# Patient Record
Sex: MALE | Race: WHITE | NOT HISPANIC OR LATINO | Employment: FULL TIME | URBAN - METROPOLITAN AREA
[De-identification: names, ages, dates, MRNs, and addresses within clinical notes are randomized per-mention and may not be internally consistent; named-entity substitution may affect disease eponyms.]

---

## 2017-06-01 DIAGNOSIS — Z00.00 WELL ADULT EXAM: ICD-10-CM

## 2017-06-02 ENCOUNTER — HOSPITAL ENCOUNTER (OUTPATIENT)
Facility: MEDICAL CENTER | Age: 52
End: 2017-06-02
Attending: FAMILY MEDICINE
Payer: COMMERCIAL

## 2017-06-02 ENCOUNTER — APPOINTMENT (OUTPATIENT)
Dept: OTHER | Facility: MEDICAL CENTER | Age: 52
End: 2017-06-02

## 2017-06-02 DIAGNOSIS — Z00.00 WELL ADULT EXAM: ICD-10-CM

## 2017-06-02 LAB
25(OH)D3 SERPL-MCNC: 16 NG/ML (ref 30–100)
ALBUMIN SERPL BCP-MCNC: 4 G/DL (ref 3.2–4.9)
ALBUMIN/GLOB SERPL: 1.2 G/DL
ALP SERPL-CCNC: 53 U/L (ref 30–99)
ALT SERPL-CCNC: 20 U/L (ref 2–50)
ANION GAP SERPL CALC-SCNC: 8 MMOL/L (ref 0–11.9)
APPEARANCE UR: CLEAR
AST SERPL-CCNC: 19 U/L (ref 12–45)
BASOPHILS # BLD AUTO: 0.8 % (ref 0–1.8)
BASOPHILS # BLD: 0.05 K/UL (ref 0–0.12)
BILIRUB SERPL-MCNC: 0.7 MG/DL (ref 0.1–1.5)
BILIRUB UR QL STRIP.AUTO: NEGATIVE
BUN SERPL-MCNC: 16 MG/DL (ref 8–22)
CALCIUM SERPL-MCNC: 9.2 MG/DL (ref 8.5–10.5)
CHLORIDE SERPL-SCNC: 104 MMOL/L (ref 96–112)
CO2 SERPL-SCNC: 25 MMOL/L (ref 20–33)
COLOR UR: COLORLESS
CREAT SERPL-MCNC: 0.78 MG/DL (ref 0.5–1.4)
CREAT UR-MCNC: 45.2 MG/DL
CRP SERPL HS-MCNC: 5.7 MG/L (ref 0–7.5)
EOSINOPHIL # BLD AUTO: 0.28 K/UL (ref 0–0.51)
EOSINOPHIL NFR BLD: 4.5 % (ref 0–6.9)
ERYTHROCYTE [DISTWIDTH] IN BLOOD BY AUTOMATED COUNT: 40.9 FL (ref 35.9–50)
EST. AVERAGE GLUCOSE BLD GHB EST-MCNC: 117 MG/DL
GFR SERPL CREATININE-BSD FRML MDRD: >60 ML/MIN/1.73 M 2
GLOBULIN SER CALC-MCNC: 3.4 G/DL (ref 1.9–3.5)
GLUCOSE SERPL-MCNC: 78 MG/DL (ref 65–99)
GLUCOSE UR STRIP.AUTO-MCNC: NEGATIVE MG/DL
HBA1C MFR BLD: 5.7 % (ref 0–5.6)
HCT VFR BLD AUTO: 49.8 % (ref 42–52)
HGB BLD-MCNC: 16.4 G/DL (ref 14–18)
IMM GRANULOCYTES # BLD AUTO: 0.02 K/UL (ref 0–0.11)
IMM GRANULOCYTES NFR BLD AUTO: 0.3 % (ref 0–0.9)
KETONES UR STRIP.AUTO-MCNC: NEGATIVE MG/DL
LEUKOCYTE ESTERASE UR QL STRIP.AUTO: NEGATIVE
LYMPHOCYTES # BLD AUTO: 1.59 K/UL (ref 1–4.8)
LYMPHOCYTES NFR BLD: 25.3 % (ref 22–41)
MCH RBC QN AUTO: 28.6 PG (ref 27–33)
MCHC RBC AUTO-ENTMCNC: 32.9 G/DL (ref 33.7–35.3)
MCV RBC AUTO: 86.8 FL (ref 81.4–97.8)
MICRO URNS: NORMAL
MICROALBUMIN UR-MCNC: <0.7 MG/DL
MICROALBUMIN/CREAT UR: NORMAL MG/G (ref 0–30)
MONOCYTES # BLD AUTO: 0.39 K/UL (ref 0–0.85)
MONOCYTES NFR BLD AUTO: 6.2 % (ref 0–13.4)
NEUTROPHILS # BLD AUTO: 3.95 K/UL (ref 1.82–7.42)
NEUTROPHILS NFR BLD: 62.9 % (ref 44–72)
NITRITE UR QL STRIP.AUTO: NEGATIVE
NRBC # BLD AUTO: 0 K/UL
NRBC BLD AUTO-RTO: 0 /100 WBC
PH UR STRIP.AUTO: 6.5 [PH]
PLATELET # BLD AUTO: 212 K/UL (ref 164–446)
PMV BLD AUTO: 11.4 FL (ref 9–12.9)
POTASSIUM SERPL-SCNC: 3.8 MMOL/L (ref 3.6–5.5)
PROT SERPL-MCNC: 7.4 G/DL (ref 6–8.2)
PROT UR QL STRIP: NEGATIVE MG/DL
PSA SERPL-MCNC: 3.41 NG/ML (ref 0–4)
RBC # BLD AUTO: 5.74 M/UL (ref 4.7–6.1)
RBC UR QL AUTO: NEGATIVE
SODIUM SERPL-SCNC: 137 MMOL/L (ref 135–145)
SP GR UR STRIP.AUTO: 1.01
T4 FREE SERPL-MCNC: 1.02 NG/DL (ref 0.53–1.43)
TSH SERPL DL<=0.005 MIU/L-ACNC: 1.73 UIU/ML (ref 0.3–3.7)
WBC # BLD AUTO: 6.3 K/UL (ref 4.8–10.8)

## 2017-06-04 LAB
LPA SERPL-MCNC: 10 MG/DL
SHBG SERPL-SCNC: 37 NMOL/L (ref 11–80)
TESTOST FREE MFR SERPL: 1.7 % (ref 1.6–2.9)
TESTOST FREE SERPL-MCNC: 54 PG/ML (ref 47–244)
TESTOST SERPL-MCNC: 318 NG/DL (ref 300–890)

## 2017-06-07 LAB
CHOLEST SERPL-MCNC: 189 MG/DL (ref 100–199)
HDL PARTICAL NO Q4363: 29.7 UMOL/L
HDL SERPL QN: 9.2 NM
HDLC SERPL-MCNC: 50 MG/DL
HLD.LARGE SERPL-SCNC: 5.8 UMOL/L
LDL MED SERPL QN: 21.8 NM
LDL SERPL QN: 21.8 NM
LDL SERPL-SCNC: 1164 NMOL/L
LDL SMALL SERPL-SCNC: 235 NMOL/L
LDL SMALL SERPL-SCNC: 235 NMOL/L
LDLC SERPL CALC-MCNC: 111 MG/DL (ref 0–99)
LP IR SCORE Q4364: 40
TRIGL SERPL-MCNC: 140 MG/DL (ref 0–149)
VLDL LARGE SERPL-SCNC: 3.6 NMOL/L
VLDL SERPL QN: 45.4 NM

## 2017-06-22 ENCOUNTER — HOSPITAL ENCOUNTER (OUTPATIENT)
Dept: RADIOLOGY | Facility: MEDICAL CENTER | Age: 52
End: 2017-06-22
Attending: FAMILY MEDICINE
Payer: COMMERCIAL

## 2017-06-22 ENCOUNTER — OFFICE VISIT (OUTPATIENT)
Dept: OTHER | Facility: MEDICAL CENTER | Age: 52
End: 2017-06-22

## 2017-06-22 VITALS
SYSTOLIC BLOOD PRESSURE: 120 MMHG | OXYGEN SATURATION: 95 % | RESPIRATION RATE: 16 BRPM | DIASTOLIC BLOOD PRESSURE: 84 MMHG | TEMPERATURE: 98.8 F | BODY MASS INDEX: 40.3 KG/M2 | HEIGHT: 74 IN | WEIGHT: 314 LBS | HEART RATE: 76 BPM

## 2017-06-22 DIAGNOSIS — Z00.00 WELL ADULT EXAM: ICD-10-CM

## 2017-06-22 DIAGNOSIS — R79.82 CRP ELEVATED: ICD-10-CM

## 2017-06-22 DIAGNOSIS — E78.5 DYSLIPIDEMIA: ICD-10-CM

## 2017-06-22 DIAGNOSIS — E66.01 MORBID OBESITY DUE TO EXCESS CALORIES (HCC): ICD-10-CM

## 2017-06-22 DIAGNOSIS — N28.1 SIMPLE RENAL CYST: ICD-10-CM

## 2017-06-22 DIAGNOSIS — R97.20 RISING PSA LEVEL: ICD-10-CM

## 2017-06-22 DIAGNOSIS — R73.09 ELEVATED HEMOGLOBIN A1C: ICD-10-CM

## 2017-06-22 DIAGNOSIS — Z83.3 FAMILY HISTORY OF DIABETES MELLITUS IN SISTER: ICD-10-CM

## 2017-06-22 DIAGNOSIS — K80.20 ASYMPTOMATIC CHOLELITHIASIS: ICD-10-CM

## 2017-06-22 DIAGNOSIS — Z28.39 BEHIND ON IMMUNIZATIONS: ICD-10-CM

## 2017-06-22 DIAGNOSIS — Z00.00 WELL ADULT EXAM: Primary | ICD-10-CM

## 2017-06-22 DIAGNOSIS — K76.0 FATTY LIVER: ICD-10-CM

## 2017-06-22 DIAGNOSIS — E55.9 VITAMIN D DEFICIENCY: ICD-10-CM

## 2017-06-22 PROCEDURE — 306734 HCHG ADVANCED VASCULAR SCREENING

## 2017-06-22 PROCEDURE — 76700 US EXAM ABDOM COMPLETE: CPT

## 2017-06-22 PROCEDURE — 71020 DX-CHEST-2 VIEWS: CPT

## 2017-06-22 PROCEDURE — 4410556 CT-CARDIAC SCORING

## 2017-06-22 NOTE — Clinical Note
"July 6, 2017        Tommy Guillen  [address] [place] [address]        Dear Tommy:    Thank you for participating in [facility].  I enjoyed meeting you again today and performing your Executive History and Physical examination.  We covered a great deal of information, and I have summarized my Assessment and Plan below.  Please carefully review all the information in this packet.  I do suggest you schedule an appointment with a Primary Care Provider soon to review the results of your examination and develop a plan moving forward.    We did identify several issues that require further attention.  You have been overweight for some time, and your current weight classifies you as morbidly obese signifying that this degree of weight is significantly detrimental to your overall health, including your heart health.  Other Cardiovascular Disease risk factors for you include dyslipidemia, vitamin D deficiency, elevated Hemoglobin A1c, gallstones, and fatty liver as seen on ultrasound.  The best treatment for most of these maladies is Therapeutic Lifestyle Changes.  Specifically, I recommend eating \"real food, mostly plants, not too much,\" daily exercise, active stress management, 7-8 hours of quality sleep per night, striving for a loving social environment, and having an altruistic philosophy.  An excellent resource for these Therapeutic Lifestyle Changes is the book \"Spectrum\" by Dr. Bernardino Dempsey.  Please discuss with your Primary Care Provider the possibility of initiating low-dose aspirin therapy and also Vitamin D supplementation.  I do recommend you have a one-time Zostavax/Shingles vaccine soon. If you have any questions or concerns, please don't hesitate to call.        Sincerely,        Vaughn Morris M.D.            ASSESSMENT:  Encounter Diagnoses   Name Primary?   • Executive History and Physical Examination Yes   • Morbid obesity due to excess calories (CMS-HCC)    • Dyslipidemia    • " "Elevated hemoglobin A1c    • Family history of diabetes mellitus in sister    • hsCRP elevated    • Rising PSA level    • Vitamin D deficiency    • Fatty liver    • Simple right renal cyst    • Asymptomatic cholelithiasis    • Behind on immunizations    PLAN:  Total Face-to-Face time spent with patient: 75 minutes  Amount of time spent counseling patient and/or coordinating care: 50 minutes  The nature of patient counseling as below:  -Patient Education  -Differential Diagnoses and treatment options discussed  -Risks, benefits, alternatives discussed  -Labs reviewed with patient in detail  -Imaging/PFT/ETT/vision/hearing reviewed with patient in detail  -Health Maintenance Exam issues discussed  -Exercise  -Dietary recommendations discussed  -Weight Loss strategies discussed  -Including bibliotherapy in form of \"Spectrum\" by Dr. Bernardino Dempsey   -Therapeutic Lifestyle Changes discussed  The nature of coordination of care as below:  -Medications added: Final decisions regarding medications to be made between patient and Primary Care Provider.  I do recommend consideration be given for initiating daily low-dose aspirin therapy for Myocardial Infarction prophylaxis in this 51 year-old male.  I also recommend consideration for OTC Vitamin D supplementation.   -Medications discontinued: none  -Medications adjusted: none  -Continue present chronic medications  -Referrals: I encouraged patient to schedule appointment with Primary Care Provider soon to review results of today's examination and develop a plan moving forward.   -Other: I recommend Zostavax/Shingles vaccine ASAP  -Seek medical attention immediately if worse  FOLLOW-UP:  -With Primary Care Provider soon     "

## 2017-06-22 NOTE — PROGRESS NOTES
[facility]    EXECUTIVE HISTORY AND PHYSICAL  Performed by Dr. Vaughn Morris    SUBJECTIVE:    Chief Complaint   Patient presents with   • Executive Physical   • Obesity     BMI > 40 (Morbid Obesity)   • Hyperlipidemia   • Abnormal Labs     A1c. hsCRP. PSA rising   • Vitamin D Deficiency   • Other     Fatty Liver and simple Right kidney cyst on Ultrasound    • Cholelithiasis     Known, again seen on U/S       Tommy Guillen is a 51 y.o. male,   New Patient @ [facility]    Preventive medicine issues discussed:  abuse, aspirin, dental, Alcohol, Tobacco, HIV/AIDS, injuries, mental health/depression, nutrition, exercise, occupational health, sexual behavior, UV exposure,Cancer Screening. Vaccines.      PROBLEM #1-HISTORY OF PRESENT ILLNESS  Existing Problem  PATIENT STATEMENT OF PROBLEM - Morbid Obesity, BMI > 40  ONSET - years  COURSE - BMI > 40.  Counseled patient at length regarding health consequences.   INTENSITY/STATUS/LOCATION/RADIATION - severe/ present/ diffuse  AGGRAVATORS - Multifactorial including inadequate Therapeutic Lifestyle Changes    RELIEVERS - ?  TREATMENTS/COMPLIANCE/@GOAL? - none/ inadequate Therapeutic Lifestyle Changes / no     PROBLEM #2-HISTORY OF PRESENT ILLNESS  PATIENT STATEMENT OF PROBLEM - Dyslipidemia  ONSET - discussed today  COURSE - Asymptomatic. No CVD/CeVD event history.  CTCS: 0. ETT normal/negative. Current pertinent labs include:   HIGH & INT RISK: LDL-P/L-VLDL-P/LDL/HDL-P/A1c(5.7%)/D(16)/hsCRP(5.7).   Counseled.   INTENSITY/STATUS/LOCATION/RADIATION - variable/ present  AGGRAVATORS - Multifactorial including inadequate Therapeutic Lifestyle Changes    RELIEVERS - none  TREATMENTS/COMPLIANCE/@GOAL? - none/ inadequate Therapeutic Lifestyle Changes / no     PROBLEM #3-HISTORY OF PRESENT ILLNESS  New Problem  PATIENT STATEMENT OF PROBLEM - Rising PSA  ONSET - 4 years  COURSE - PSA has increased from 1.79 to 2.39 to 3.41 in 2013, 2015, and 2017  respectively. Asymptomatic. Counseled.   INTENSITY/STATUS/LOCATION/RADIATION - moderate  AGGRAVATORS - ?  RELIEVERS - none  TREATMENTS/COMPLIANCE/@GOAL? - none/ na/ no    PROBLEM #4-HISTORY OF PRESENT ILLNESS  New Problem  PATIENT STATEMENT OF PROBLEM - Ultrasound findings  ONSET - identified and/or re-identified today  COURSE - Asymptomatic. Known Gallstones are again seen.  Fatty liver and simple R kidney cyst seen on U/S. Counseled.   INTENSITY/STATUS/LOCATION/RADIATION - variable/ present/ as above  AGGRAVATORS - Multifactorial   RELIEVERS - none  TREATMENTS/COMPLIANCE/@GOAL? - none/ inadequate Therapeutic Lifestyle Changes / no     Diagnosing METABOLIC SYNDROME  -?-Must have 3 or more of the following 5 Risk Factors(Patient meets criteria # 1)     RISK FACTOR    DEFINING LEVEL  1-Abdominal Obesity        Waist [email or web address]   Men (North Americans)  >102 cm (>40 inches)   Women (North Americans)  >88 cm (>35 inches)  (see literature for Ethnic Group waist circumference differences)  2-Triglycerides ?150 mg/dL (or on treatment for this lipid disorder)  3-HDL Cholesterol    Men  <40 mg/dL (or on treatment for this lipid disorder)   Women <50 mg/dL (or on treatment for this lipid disorder)  4-Blood Pressure  ?130 systolic or ?84 diastolic (or on HTN treatment)  5-Fasting Glucose ?100 mg/dL(or previously diagnosed DM or Ins. Resistance)  (FYI: If FBS ?100 mg/dL, then patient also has Insulin Resistance)    Synonyms  Hypertension-hyperglycemia-hyperuricemia syndrome   Syndrome X   Dysmetabolic syndrome X   Insulin resistance syndrome   Metabolic dyslipidemia   The deadly quartet (upper-body obesity, glucose intolerance, hypertriglyceridemia, and hypertension)   Civilization syndrome  Reaven Syndrome    Diagnosing INSULIN RESISTANCE: Any 1 of following (Patient meets no criteria)  1. Presence of METABOLIC SYNDROME  2. TRIGLYCERIDE/HDL RATIO:  - >3.5 = IR in Caucasians  - ?3.0 = IR in  /Mal[place]ian Americans  - ?2.0 = IR in Non- Blacks  3. Fasting Blood Sugar ? 100 mg/dL         (If FBS > 126, then DM2)  4. Oral Glucose Tolerance Test   - One hour glucose: ? 125 mg/dL   - (If > 150, significantly increased risk of developing DM2)  - Two hour glucose: ? 120 mg/dL  - (120-139=only 33% B-cell fx. 140-199=only 15% B-cell fx)   - (200 or above=DM2 and only 10% B-cell fx.)  - PRE-DIABETES, a type of Insulin Resistance:  o Two hour glucose of 140 to 199  5. A1c ? 6.5%                     (or ? 5.7 % AND the following 2 Dental Parameters:    1- ? 26% of Gum Pockets are ?5mm depth    2- ? 4 Missing Teeth)        No Known Allergies    Patient Active Problem List    Diagnosis Date Noted   • Executive History and Physical Examination 07/06/2017   • Morbid obesity due to excess calories (CMS-[facility]) 07/06/2017   • Dyslipidemia 07/06/2017   • Elevated hemoglobin A1c 07/06/2017   • hsCRP elevated 07/06/2017   • Rising PSA level 07/06/2017   • Vitamin D deficiency 07/06/2017   • Fatty liver 07/06/2017   • Simple right renal cyst 07/06/2017   • Family history of diabetes mellitus in sister 06/22/2017   • Chest pressure 11/01/2016   • BPPV (benign paroxysmal positional vertigo) 11/16/2015   • Colon polyps 11/16/2015   • Morbid obesity (CMS-HCC) 07/02/2013   • Asymptomatic cholelithiasis    • Family history of colon cancer        No current outpatient prescriptions on file prior to visit.     No current facility-administered medications on file prior to visit.       Social History     Social History   • Marital Status:      Spouse Name: Lisa   • Number of Children: 1   • Years of Education: 18     Occupational History   • Not on file.     Social History Main Topics   • Smoking status: Never Smoker    • Smokeless tobacco: Never Used   • Alcohol Use: 0.6 oz/week     1 Glasses of wine per week      Comment: Rare   • Drug Use: No   • Sexual Activity:     Partners: Female     Other Topics Concern   • Not  "on file     Social History Narrative       Family History   Problem Relation Age of Onset   • Cancer Father    • Stroke Father    • Hypertension Father    • Lung Disease Father    • Diabetes Sister      Resolved with weight loss   • Heart Disease Maternal Uncle    • Lung Disease Paternal Uncle        Patient's Past, Social, and Family History reviewed     REVIEW OF SYMPTOMS:               Pertinent Positives as above.    All other systems reviewed and negative.     OBJECTIVE:    /84 mmHg  Pulse 76  Temp(Src) 37.1 °C (98.8 °F)  Resp 16  Ht 1.88 m (6' 2\")  Wt 142.429 kg (314 lb)  BMI 40.30 kg/m2  SpO2 95%  Body mass index is 40.3 kg/(m^2).    Well developed, well nourished obese male, no acute distress, non-ill appearing. Comfortable, appears stated age, pleasant and cooperative, Alert and Oriented x 3.   HEAD: atraumatic, normocephalic   EYES: Conjunctiva normal, EOMI, PERRLA, acuity grossly intact.   EARS/NOSE/THROAT: TM's normal, no SSX of infection, no perforation, no hemotympanum, acuity grossly intact. Oropharynx: benign, no lesions noted. Nares: benign.   NECK: supple, no adenopathy, no thyromegaly or nodules, no JVD, no carotid bruits.   CHEST/LUNGS: clear to auscultation and percussion bilaterally. No adventitious breath sounds.   CARDIOVASCULAR: regular rate and rhythm, no murmur. PMI not displaced. Good central and peripheral pulses.   BACK: no CVA tenderness.   ABDOMEN: soft, non-tender, non-distended, no masses, no hepatosplenomegaly. Normal active bowel tones.   : deferred.   Rectal: prostate wnl, no palpable abnormalities.   Extremities: warm/well-perfused, no cyanosis, clubbing, or edema.   SKIN: clear, unbroken, no rashes, normal turgor.   Neuro: Mental Status: Alert and Oriented x 3. CN II-XII grossly intact. Gait normal. Non-focal, intact. Normal strength, sensation    EXERCISE STRESS TEST REPORT:    Interpreted by me    INTERPRETATION:  Patient achieved 92% of maximum predicted heart " "rate with physiological response in blood pressure and no associated ST segment depression.   Also, specifically no associated ST elevation, no significant ventricular extrasystoles, no ventricular tachycardia, no new atrial fibrillation or supraventricular tachycardia, and  no new heart blocks.  Patient denied chest pain, severe dyspnea, dizziness, or ataxia.     CONCLUSION:  Normal Exercise Stress Test indicating low probability of flow-limiting coronary artery disease.     ASSESSMENT:    Encounter Diagnoses   Name Primary?   • Executive History and Physical Examination Yes   • Morbid obesity due to excess calories (CMS-HCC)    • Dyslipidemia    • Elevated hemoglobin A1c    • Family history of diabetes mellitus in sister    • hsCRP elevated    • Rising PSA level    • Vitamin D deficiency    • Fatty liver    • Simple right renal cyst    • Asymptomatic cholelithiasis    • Behind on immunizations        PLAN:    Total Face-to-Face time spent with patient: 75 minutes  Amount of time spent counseling patient and/or coordinating care: 50 minutes    The nature of patient counseling as below:  -Patient Education  -Differential Diagnoses and treatment options discussed  -Risks, benefits, alternatives discussed  -Labs reviewed with patient in detail  -Imaging/PFT/ETT/vision/hearing reviewed with patient in detail  -Health Maintenance Exam issues discussed  -Exercise  -Dietary recommendations discussed  -Weight Loss strategies discussed  -Including bibliotherapy in form of \"Spectrum\" by Dr. Bernardino Dempsey   -Therapeutic Lifestyle Changes discussed    The nature of coordination of care as below:  -Medications added: Final decisions regarding medications to be made between patient and Primary Care Provider.  I do recommend consideration be given for initiating daily low-dose aspirin therapy for Myocardial Infarction prophylaxis in this 51 year-old male.  I also recommend consideration for OTC Vitamin D supplementation. "   -Medications discontinued: none  -Medications adjusted: none  -Continue present chronic medications  -Referrals: I encouraged patient to schedule appointment with Primary Care Provider soon to review results of today's examination and develop a plan moving forward.   -Other: I recommend Zostavax/Shingles vaccine ASAP  -Seek medical attention immediately if worse    FOLLOW-UP:  -With Primary Care Provider soon

## 2017-07-06 PROBLEM — E66.01 MORBID OBESITY DUE TO EXCESS CALORIES (HCC): Status: ACTIVE | Noted: 2017-07-06

## 2017-07-06 PROBLEM — R73.09 ELEVATED HEMOGLOBIN A1C: Status: ACTIVE | Noted: 2017-07-06

## 2017-07-06 PROBLEM — Z00.00 WELL ADULT EXAM: Status: ACTIVE | Noted: 2017-07-06

## 2017-07-06 PROBLEM — N28.1 SIMPLE RENAL CYST: Status: ACTIVE | Noted: 2017-07-06

## 2017-07-06 PROBLEM — K76.0 FATTY LIVER: Status: ACTIVE | Noted: 2017-07-06

## 2017-07-06 PROBLEM — R97.20 RISING PSA LEVEL: Status: ACTIVE | Noted: 2017-07-06

## 2017-07-06 PROBLEM — E55.9 VITAMIN D DEFICIENCY: Status: ACTIVE | Noted: 2017-07-06

## 2017-07-06 PROBLEM — R79.82 CRP ELEVATED: Status: ACTIVE | Noted: 2017-07-06

## 2017-07-06 PROBLEM — E78.5 DYSLIPIDEMIA: Status: ACTIVE | Noted: 2017-07-06

## 2017-07-07 NOTE — PATIENT INSTRUCTIONS
No current [facility]-ordered outpatient prescriptions on file.     No current [facility]-ordered facility-administered medications on file.

## 2017-09-06 ENCOUNTER — OFFICE VISIT (OUTPATIENT)
Dept: MEDICAL GROUP | Facility: MEDICAL CENTER | Age: 52
End: 2017-09-06
Payer: COMMERCIAL

## 2017-09-06 VITALS
HEIGHT: 74 IN | TEMPERATURE: 97.9 F | BODY MASS INDEX: 40.17 KG/M2 | RESPIRATION RATE: 20 BRPM | WEIGHT: 313 LBS | OXYGEN SATURATION: 97 % | DIASTOLIC BLOOD PRESSURE: 80 MMHG | HEART RATE: 76 BPM | SYSTOLIC BLOOD PRESSURE: 118 MMHG

## 2017-09-06 DIAGNOSIS — L98.9 SKIN LESION OF SCALP: ICD-10-CM

## 2017-09-06 DIAGNOSIS — E66.01 MORBID OBESITY DUE TO EXCESS CALORIES (HCC): ICD-10-CM

## 2017-09-06 DIAGNOSIS — H81.10 BPPV (BENIGN PAROXYSMAL POSITIONAL VERTIGO), UNSPECIFIED LATERALITY: ICD-10-CM

## 2017-09-06 DIAGNOSIS — K76.0 FATTY LIVER: ICD-10-CM

## 2017-09-06 DIAGNOSIS — R73.03 PREDIABETES: ICD-10-CM

## 2017-09-06 DIAGNOSIS — Z00.00 HEALTHCARE MAINTENANCE: ICD-10-CM

## 2017-09-06 DIAGNOSIS — E78.5 DYSLIPIDEMIA: ICD-10-CM

## 2017-09-06 DIAGNOSIS — K80.20 ASYMPTOMATIC CHOLELITHIASIS: ICD-10-CM

## 2017-09-06 DIAGNOSIS — E55.9 VITAMIN D DEFICIENCY: ICD-10-CM

## 2017-09-06 DIAGNOSIS — R97.20 RISING PSA LEVEL: ICD-10-CM

## 2017-09-06 PROBLEM — R79.82 CRP ELEVATED: Status: RESOLVED | Noted: 2017-07-06 | Resolved: 2017-09-06

## 2017-09-06 PROBLEM — R73.09 ELEVATED HEMOGLOBIN A1C: Status: RESOLVED | Noted: 2017-07-06 | Resolved: 2017-09-06

## 2017-09-06 PROBLEM — Z83.3 FAMILY HISTORY OF DIABETES MELLITUS IN SISTER: Status: RESOLVED | Noted: 2017-06-22 | Resolved: 2017-09-06

## 2017-09-06 PROCEDURE — 99214 OFFICE O/P EST MOD 30 MIN: CPT | Performed by: FAMILY MEDICINE

## 2017-09-06 ASSESSMENT — PATIENT HEALTH QUESTIONNAIRE - PHQ9: CLINICAL INTERPRETATION OF PHQ2 SCORE: 0

## 2017-09-06 NOTE — ASSESSMENT & PLAN NOTE
The patient has known vertigo. Last bad episode was several years ago but does have occasional moments of dizziness coming on but he's able to control it. No nausea or vomiting. No symptoms currently.

## 2017-09-06 NOTE — PROGRESS NOTES
"[facility]  Progress Note  Established Patient    Subjective:   Tommy Guillen is a 52 y.o. male here today with a chief complaint of prediabetes, rising PSA and issues discussed below.     Prediabetes  6/2/2017 hemoglobin A1c 5.7%. Patient understands the importance of diet and exercise.    Healthcare maintenance  6/2/17 Lipid Panel: , , HDL 50 and . 2017 coronary calcium score 0. 10 year ACC risk calculated on 09/06/17 to be 3.5%.   Patient declines hepatitis C and HIV screening today.  Repeat colonoscopy due in 2021.    Vitamin D deficiency  6/2/17 25 hydroxy Vitamin D was 16. At the executive physical it was recommended that the patient take supplemental vitamin D.      Rising PSA level  The patient has a rising PSA. 2013 was 1.79, 2015 was 2.39, and 17 was 3.41. The patient denies urinary frequency, urgency. Patient denies unintended weight loss. Patient had a prostate exam at his executive physical which was normal. I discussed with the patient normal prostate values and velocities. I offered a urology consultation but the patient will like to continue to monitor the PSA in our clinic.    Asymptomatic cholelithiasis  Currently asymptomatic. No nausea, vomiting or diarrhea. No abdominal pain.      BPPV (benign paroxysmal positional vertigo)  The patient has known vertigo. Last bad episode was several years ago but does have occasional moments of dizziness coming on but he's able to control it. No nausea or vomiting. No symptoms currently.    Skin lesion of scalp  0.5 cm x 0.7 cm.     Morbid obesity due to excess calories (CMS-HCC)  Patient is obese. He understands the importance of diet and exercise.    Dyslipidemia  Please see \"healthcare maintenance\".    Fatty liver  Patient has ultrasonographic evidence of fatty liver. He denies right upper quadrant abdominal pain. LFTs have been within normal limits.      No current outpatient prescriptions on file prior to visit.     No " "current facility-administered medications on file prior to visit.        Past Medical History:   Diagnosis Date   • Morbid obesity (CMS-HCC) 7/2/2013   • Asymptomatic cholelithiasis 2012   • Colon polyps 2012    [facility]   • Family history of colon cancer        Allergies: Review of patient's allergies indicates no known allergies.    Surgical History:  has a past surgical history that includes claudine rectal abscess (1990s) and colonoscopy (2011).    Family History: family history includes Cancer in his father; Diabetes in his sister; Heart Disease in his maternal uncle; Hyperlipidemia in his father; Hypertension in his father; Lung Disease in his father and paternal uncle; Stroke in his father.    Social History:  reports that he has never smoked. He has never used smokeless tobacco. He reports that he drinks about 0.6 oz of alcohol per week . He reports that he does not use drugs.    ROS:  Denies unintended weight loss, nausea, vomiting, diarrhea, abdominal pain, urinary urgency, urinary frequency.        Objective:     Vitals:    09/06/17 0805   BP: 118/80   Pulse: 76   Resp: 20   Temp: 36.6 °C (97.9 °F)   SpO2: 97%   Weight: (!) 142 kg (313 lb)   Height: 1.88 m (6' 2\")       Physical Exam:  General: alert in no apparent distress.   Cardio: regular rate and rhythm, no murmurs, rubs or gallops.   Resp: CTAB no w/r/r.   Skin: 0.5 cm X 0.7 centimeter rough, brown lesion on the scalp in the area of the part. This has a stuck on appearance.        Assessment and Plan:     1. Rising PSA level  Offered urology consultation. The patient would like to continue to monitor here. Repeating PSA in one year.  - PROSTATE SPECIFIC AG SCREENING; Future    2. Prediabetes  Discussed diet and exercise.  - HEMOGLOBIN A1C; Future    3. Healthcare maintenance  See history of present illness.    4. Vitamin D deficiency  Patient was advised at the executive physical to supplement with vitamin D.    5. Asymptomatic " cholelithiasis  Discussed surgical consultation versus watchful waiting. The patient would like to continue to monitor. He was advised to call with any abdominal pain.    6. BPPV (benign paroxysmal positional vertigo), unspecified laterality  Established, stable. No recent events. Patient advised to call with any worsening.    7. Dyslipidemia  No indication for statin therapy at the present time.  - COMP METABOLIC PANEL; Future  - LIPID PROFILE; Future    8. Fatty liver  Established, stable. Discussed diet and exercise. Repeat CMP in one year.    9. Skin lesion of scalp  This appears to be a seborrheic keratosis. I offered biopsy versus dermatology consultation. The patient would like to continue to monitor this here. He will call with any changes.    10. Morbid obesity due to excess calories (CMS-HCC)  Discussed diet and exercise.        Followup: Return in about 1 year (around 9/6/2018) for Wellness Visit, Long.

## 2017-09-06 NOTE — ASSESSMENT & PLAN NOTE
6/2/17 Lipid Panel: , , HDL 50 and . 2017 coronary calcium score 0. 10 year ACC risk calculated on 09/06/17 to be 3.5%.   Patient declines hepatitis C and HIV screening today.  Repeat colonoscopy due in 2021.

## 2017-09-06 NOTE — ASSESSMENT & PLAN NOTE
Patient has ultrasonographic evidence of fatty liver. He denies right upper quadrant abdominal pain. LFTs have been within normal limits.

## 2017-09-06 NOTE — ASSESSMENT & PLAN NOTE
The patient has a rising PSA. 2013 was 1.79, 2015 was 2.39, and 17 was 3.41. The patient denies urinary frequency, urgency. Patient denies unintended weight loss. Patient had a prostate exam at his executive physical which was normal. I discussed with the patient normal prostate values and velocities. I offered a urology consultation but the patient will like to continue to monitor the PSA in our clinic.

## 2017-09-06 NOTE — ASSESSMENT & PLAN NOTE
6/2/17 25 hydroxy Vitamin D was 16. At the executive physical it was recommended that the patient take supplemental vitamin D.

## 2017-11-06 ENCOUNTER — TELEPHONE (OUTPATIENT)
Dept: MEDICAL GROUP | Facility: MEDICAL CENTER | Age: 52
End: 2017-11-06

## 2017-11-06 ENCOUNTER — NON-PROVIDER VISIT (OUTPATIENT)
Dept: MEDICAL GROUP | Facility: MEDICAL CENTER | Age: 52
End: 2017-11-06
Payer: COMMERCIAL

## 2017-11-06 DIAGNOSIS — Z23 NEED FOR INFLUENZA VACCINATION: ICD-10-CM

## 2017-11-06 PROCEDURE — 90471 IMMUNIZATION ADMIN: CPT | Performed by: PHYSICIAN ASSISTANT

## 2017-11-06 PROCEDURE — 90686 IIV4 VACC NO PRSV 0.5 ML IM: CPT | Performed by: PHYSICIAN ASSISTANT

## 2018-09-04 ENCOUNTER — OFFICE VISIT (OUTPATIENT)
Dept: MEDICAL GROUP | Facility: MEDICAL CENTER | Age: 53
End: 2018-09-04
Payer: COMMERCIAL

## 2018-09-04 VITALS
WEIGHT: 315 LBS | TEMPERATURE: 97.8 F | SYSTOLIC BLOOD PRESSURE: 120 MMHG | HEART RATE: 73 BPM | OXYGEN SATURATION: 96 % | BODY MASS INDEX: 40.43 KG/M2 | HEIGHT: 74 IN | RESPIRATION RATE: 18 BRPM | DIASTOLIC BLOOD PRESSURE: 70 MMHG

## 2018-09-04 DIAGNOSIS — Z00.00 HEALTHCARE MAINTENANCE: ICD-10-CM

## 2018-09-04 DIAGNOSIS — E78.5 DYSLIPIDEMIA: ICD-10-CM

## 2018-09-04 DIAGNOSIS — R73.03 PREDIABETES: ICD-10-CM

## 2018-09-04 DIAGNOSIS — M25.552 LEFT HIP PAIN: ICD-10-CM

## 2018-09-04 DIAGNOSIS — Z23 NEED FOR VACCINATION: ICD-10-CM

## 2018-09-04 DIAGNOSIS — L98.9 SKIN LESION OF SCALP: ICD-10-CM

## 2018-09-04 DIAGNOSIS — R97.20 RISING PSA LEVEL: ICD-10-CM

## 2018-09-04 DIAGNOSIS — K76.0 FATTY LIVER: ICD-10-CM

## 2018-09-04 DIAGNOSIS — B35.4 TINEA CORPORIS: ICD-10-CM

## 2018-09-04 DIAGNOSIS — L91.8 ACROCHORDON: ICD-10-CM

## 2018-09-04 DIAGNOSIS — Z13.6 SCREENING FOR ISCHEMIC HEART DISEASE: ICD-10-CM

## 2018-09-04 DIAGNOSIS — Z72.89 OTHER PROBLEMS RELATED TO LIFESTYLE: ICD-10-CM

## 2018-09-04 PROCEDURE — 90686 IIV4 VACC NO PRSV 0.5 ML IM: CPT | Performed by: FAMILY MEDICINE

## 2018-09-04 PROCEDURE — 90471 IMMUNIZATION ADMIN: CPT | Performed by: FAMILY MEDICINE

## 2018-09-04 PROCEDURE — 99214 OFFICE O/P EST MOD 30 MIN: CPT | Mod: 25 | Performed by: FAMILY MEDICINE

## 2018-09-04 PROCEDURE — 11200 RMVL SKIN TAGS UP TO&INC 15: CPT | Performed by: FAMILY MEDICINE

## 2018-09-04 RX ORDER — KETOCONAZOLE 20 MG/G
CREAM TOPICAL
Qty: 60 G | Refills: 1 | Status: SHIPPED | OUTPATIENT
Start: 2018-09-04 | End: 2018-10-16

## 2018-09-04 ASSESSMENT — PATIENT HEALTH QUESTIONNAIRE - PHQ9: CLINICAL INTERPRETATION OF PHQ2 SCORE: 0

## 2018-09-05 NOTE — ASSESSMENT & PLAN NOTE
Lipids: ordered.   Fasting Glucose: ordered.   Hepatitis C Screen: ordered.   Colonoscopy: due 2021.     Tdap: given 2009.   Flu shot: given 09/4/18.

## 2018-09-05 NOTE — ASSESSMENT & PLAN NOTE
Patient has a number of axillary and cervical skin tags that are bothersome to him as they tend to catch on things.

## 2018-09-05 NOTE — PATIENT INSTRUCTIONS
Trochanteric Bursitis  Trochanteric bursitis is a condition that causes hip pain. Trochanteric bursitis happens when fluid-filled sacs (bursae) in the hip get irritated. Normally these sacs absorb shock and help strong bands of tissue (tendons) in your hip glide smoothly over each other and over your hip bones.  What are the causes?  This condition results from increased friction between the hip bones and the tendons that go over them. This condition can happen if you:  · Have weak hips.  · Use your hip muscles too much (overuse).  · Get hit in the hip.  What increases the risk?  This condition is more likely to develop in:  · Women.  · Adults who are middle-aged or older.  · People with arthritis or a spinal condition.  · People with weak buttocks muscles (gluteal muscles).  · People who have one leg that is shorter than the other.  · People who participate in certain kinds of athletic activities, such as:  ¨ Running sports, especially long-distance running.  ¨ Contact sports, like football or martial arts.  ¨ Sports in which falls may occur, like skiing.  What are the signs or symptoms?  The main symptom of this condition is pain and tenderness over the point of your hip. The pain may be:  · Sharp and intense.  · Dull and achy.  · Felt on the outside of your thigh.  It may increase when you:  · Lie on your side.  · Walk or run.  · Go up on stairs.  · Sit.  · Stand up after sitting.  · Stand for long periods of time.  How is this diagnosed?  This condition may be diagnosed based on:  · Your symptoms.  · Your medical history.  · A physical exam.  · Imaging tests, such as:  ¨ X-rays to check your bones.  ¨ An MRI or ultrasound to check your tendons and muscles.  During your physical exam, your health care provider will check the movement and strength of your hip. He or she may press on the point of your hip to check for pain.  How is this treated?  This condition may be treated by:  · Resting.  · Reducing your  activity.  · Avoiding activities that cause pain.  · Using crutches, a cane, or a walker to decrease the strain on your hip.  · Taking medicine to help with swelling.  · Having medicine injected into the bursae to help with swelling.  · Using ice, heat, and massage therapy for pain relief.  · Physical therapy exercises for strength and flexibility.  · Surgery (rare).  Follow these instructions at home:  Activity  · Rest.  · Avoid activities that cause pain.  · Return to your normal activities as told by your health care provider. Ask your health care provider what activities are safe for you.  Managing pain, stiffness, and swelling  · Take over-the-counter and prescription medicines only as told by your health care provider.  · If directed, apply heat to the injured area as told by your health care provider.  ¨ Place a towel between your skin and the heat source.  ¨ Leave the heat on for 20-30 minutes.  ¨ Remove the heat if your skin turns bright red. This is especially important if you are unable to feel pain, heat, or cold. You may have a greater risk of getting burned.  · If directed, apply ice to the injured area:  ¨ Put ice in a plastic bag.  ¨ Place a towel between your skin and the bag.  ¨ Leave the ice on for 20 minutes, 2-3 times a day.  General instructions  · If the affected leg is one that you use for driving, ask your health care provider when it is safe to drive.  · Use crutches, a cane, or a walker as told by your health care provider.  · If one of your legs is shorter than the other, get fitted for a shoe insert.  · Lose weight if you are overweight.  How is this prevented?  · Wear supportive footwear that is appropriate for your sport.  · If you have hip pain, start any new exercise or sport slowly.  · Maintain physical fitness, including:  ¨ Strength.  ¨ Flexibility.  Contact a health care provider if:  · Your pain does not improve with 2-4 weeks.  Get help right away if:  · You develop severe  pain.  · You have a fever.  · You develop increased redness over your hip.  · You have a change in your bowel function or bladder function.  · You cannot control the muscles in your feet.  This information is not intended to replace advice given to you by your health care provider. Make sure you discuss any questions you have with your health care provider.  Document Released: 01/25/2006 Document Revised: 08/23/2017 Document Reviewed: 12/02/2016  [employer] Interactive Patient Education © 2017 [employer]

## 2018-09-05 NOTE — PROGRESS NOTES
[facility]  Progress Note  Established Patient    Subjective:   Tommy Guillen is a 53 y.o. male here today with a chief complaint of skin tags and here for a wellness exam. The patient is alone.     Healthcare maintenance  Lipids: ordered.   Fasting Glucose: ordered.   Hepatitis C Screen: ordered.   Colonoscopy: due 2021.     Tdap: given 2009.   Flu shot: given 09/4/18.     Left hip pain  The patient describes a 6 month history of nonprogressive left lateral hip pain. There was no trauma. Walking doesn't seem to bother him. Lying on it at night makes it worse. He has not tried any alleviating factors.    Tinea corporis  Patient describes bilateral axillary skin rashes that come and go. He believes it's ringworm. He has used over-the-counter remedies without success.    Dyslipidemia  The patient has dyslipidemia and is not maintained on a statin medicine.    Fatty liver  Patient has ultrasonographic evidence of fatty liver. He denies right upper quadrant abdominal pain. LFTs have been within normal limits.    Prediabetes  Noted on past labs.    Rising PSA level  We have been surveilling this patient for a rising PSA, however, the numbers have remained within normal range.    Skin lesion of scalp  This issue has resolved.    Acrochordon  Patient has a number of axillary and cervical skin tags that are bothersome to him as they tend to catch on things.      No current outpatient prescriptions on file prior to visit.     No current facility-administered medications on file prior to visit.        Past Medical History:   Diagnosis Date   • Asymptomatic cholelithiasis 2012   • Colon polyps 2012    [facility]   • Family history of colon cancer    • Morbid obesity (HCC) 7/2/2013       Allergies: Patient has no known allergies.    Surgical History:  has a past surgical history that includes claudine rectal abscess (1990s) and colonoscopy (2011).    Family History: family history includes Cancer in his father;  "Diabetes in his sister; Heart Disease in his maternal uncle; Hyperlipidemia in his father; Hypertension in his father; Lung Disease in his father and paternal uncle; Stroke in his father.    Social History:  reports that he has never smoked. He has never used smokeless tobacco. He reports that he drinks about 0.6 oz of alcohol per week . He reports that he does not use drugs.    ROS: no CP or SOB.        Objective:     Vitals:    09/04/18 1649   BP: 120/70   Pulse: 73   Resp: 18   Temp: 36.6 °C (97.8 °F)   SpO2: 96%   Weight: (!) 146.5 kg (323 lb)   Height: 1.88 m (6' 2\")       Physical Exam:  General: alert in no apparent distress.   Cardio: regular rate and rhythm, no murmurs, rubs or gallops.   Resp: CTAB no w/r/r.   Skin: The patient has several cervical and axillary skin tags without redness, swelling or warmth. In the bilateral axillae, there are also a number of skin rashes with an erythematous ring and central clearing.  MSK: No tenderness to palpation of the spine. Slight tenderness over the left trochanteric bursa.    Cryotherapy Procedure Note:  I discussed the risks and benefits of cryotherapy with the patient who gave verbal consent for the procedure. Two applications of cryotherapy were applied to 10 skin tags in the axillary and cervical regions. The patient tolerated the procedure well and there were no complications. Aftercare was discussed.      Assessment and Plan:     1. Need for vaccination  - Flu Quad Inj >3 Year Pre-Filled (Preservative Free)    2. Healthcare maintenance  - see HPI.   - discussed diet and exercise, Mediterranean Diet handout given.     3. Left hip pain  This is a new problem. Suspect trochanteric bursitis.   - salonpas.   - if no improvement, injection.     4. Screening for ischemic heart disease  - lipid panel.     5. Prediabetes  - HEMOGLOBIN A1C; Future    6. Rising PSA level  - PROSTATE SPECIFIC AG SCREENING; Future    7. Fatty liver  Will continue to monitor.   - COMP " METABOLIC PANEL; Future    8. Dyslipidemia  - COMP METABOLIC PANEL; Future  - LIPID PROFILE; Future    9. Other problems related to lifestyle  - HEP C VIRUS ANTIBODY; Future    10. Tinea corporis  New problem.   - ketoconazole (NIZORAL) 2 % Cream; Apply a thin layer to affected area daily x 2-4 weeks.  Dispense: 60 g; Refill: 1    11. Skin lesion of scalp  - resolved.     12. Acrochordon  - cryotherapy (see procedure note above).         Followup: Return in about 6 weeks (around 10/16/2018), or if symptoms worsen or fail to improve.

## 2018-09-05 NOTE — ASSESSMENT & PLAN NOTE
The patient describes a 6 month history of nonprogressive left lateral hip pain. There was no trauma. Walking doesn't seem to bother him. Lying on it at night makes it worse. He has not tried any alleviating factors.

## 2018-09-05 NOTE — ASSESSMENT & PLAN NOTE
We have been surveilling this patient for a rising PSA, however, the numbers have remained within normal range.

## 2018-09-05 NOTE — ASSESSMENT & PLAN NOTE
Patient describes bilateral axillary skin rashes that come and go. He believes it's ringworm. He has used over-the-counter remedies without success.

## 2018-09-17 ENCOUNTER — APPOINTMENT (RX ONLY)
Dept: URBAN - METROPOLITAN AREA CLINIC 4 | Facility: CLINIC | Age: 53
Setting detail: DERMATOLOGY
End: 2018-09-17

## 2018-09-17 DIAGNOSIS — L82.1 OTHER SEBORRHEIC KERATOSIS: ICD-10-CM

## 2018-09-17 DIAGNOSIS — L20.89 OTHER ATOPIC DERMATITIS: ICD-10-CM

## 2018-09-17 DIAGNOSIS — Z71.89 OTHER SPECIFIED COUNSELING: ICD-10-CM

## 2018-09-17 DIAGNOSIS — D18.0 HEMANGIOMA: ICD-10-CM

## 2018-09-17 DIAGNOSIS — L30.9 DERMATITIS, UNSPECIFIED: ICD-10-CM

## 2018-09-17 DIAGNOSIS — D22 MELANOCYTIC NEVI: ICD-10-CM

## 2018-09-17 DIAGNOSIS — L81.4 OTHER MELANIN HYPERPIGMENTATION: ICD-10-CM

## 2018-09-17 PROBLEM — L20.84 INTRINSIC (ALLERGIC) ECZEMA: Status: ACTIVE | Noted: 2018-09-17

## 2018-09-17 PROBLEM — D18.01 HEMANGIOMA OF SKIN AND SUBCUTANEOUS TISSUE: Status: ACTIVE | Noted: 2018-09-17

## 2018-09-17 PROBLEM — D22.4 MELANOCYTIC NEVI OF SCALP AND NECK: Status: ACTIVE | Noted: 2018-09-17

## 2018-09-17 PROCEDURE — ? PRESCRIPTION

## 2018-09-17 PROCEDURE — 99203 OFFICE O/P NEW LOW 30 MIN: CPT

## 2018-09-17 PROCEDURE — ? ADDITIONAL NOTES

## 2018-09-17 PROCEDURE — ? COUNSELING

## 2018-09-17 PROCEDURE — ? MEDICATION COUNSELING

## 2018-09-17 RX ORDER — TRIAMCINOLONE ACETONIDE 1 MG/G
CREAM TOPICAL BID
Qty: 1 | Refills: 6 | Status: ERX | COMMUNITY
Start: 2018-09-17

## 2018-09-17 RX ADMIN — TRIAMCINOLONE ACETONIDE: 1 CREAM TOPICAL at 16:04

## 2018-09-17 ASSESSMENT — LOCATION ZONE DERM
LOCATION ZONE: HAND
LOCATION ZONE: TRUNK
LOCATION ZONE: AXILLAE
LOCATION ZONE: NECK

## 2018-09-17 ASSESSMENT — LOCATION SIMPLE DESCRIPTION DERM
LOCATION SIMPLE: POSTERIOR NECK
LOCATION SIMPLE: RIGHT AXILLARY VAULT
LOCATION SIMPLE: RIGHT HAND
LOCATION SIMPLE: LEFT UPPER BACK
LOCATION SIMPLE: LOWER BACK
LOCATION SIMPLE: LEFT AXILLARY VAULT
LOCATION SIMPLE: LEFT HAND
LOCATION SIMPLE: UPPER BACK

## 2018-09-17 ASSESSMENT — LOCATION DETAILED DESCRIPTION DERM
LOCATION DETAILED: RIGHT MEDIAL TRAPEZIAL NECK
LOCATION DETAILED: LEFT SUPERIOR MEDIAL UPPER BACK
LOCATION DETAILED: LEFT RADIAL DORSAL HAND
LOCATION DETAILED: INFERIOR THORACIC SPINE
LOCATION DETAILED: LEFT AXILLARY VAULT
LOCATION DETAILED: RIGHT AXILLARY VAULT
LOCATION DETAILED: SUPERIOR LUMBAR SPINE
LOCATION DETAILED: RIGHT ULNAR DORSAL HAND

## 2018-09-17 NOTE — PROCEDURE: ADDITIONAL NOTES
Additional Notes: Follow up if not resolved for possible biopsy
Detail Level: Simple
Additional Notes: Discussed using TAC For this problem as well.
Detail Level: Zone

## 2018-09-17 NOTE — PROCEDURE: MEDICATION COUNSELING
Clofazimine Counseling:  I discussed with the patient the risks of clofazimine including but not limited to skin and eye pigmentation, liver damage, nausea/vomiting, gastrointestinal bleeding and allergy.
Stelara Counseling:  I discussed with the patient the risks of ustekinumab including but not limited to immunosuppression, malignancy, posterior leukoencephalopathy syndrome, and serious infections.  The patient understands that monitoring is required including a PPD at baseline and must alert us or the primary physician if symptoms of infection or other concerning signs are noted.
Birth Control Pills Counseling: Birth Control Pill Counseling: I discussed with the patient the potential side effects of OCPs including but not limited to increased risk of stroke, heart attack, thrombophlebitis, deep venous thrombosis, hepatic adenomas, breast changes, GI upset, headaches, and depression.  The patient verbalized understanding of the proper use and possible adverse effects of OCPs. All of the patient's questions and concerns were addressed.
Enbrel Pregnancy And Lactation Text: This medication is Pregnancy Category B and is considered safe during pregnancy. It is unknown if this medication is excreted in breast milk.
Isotretinoin Pregnancy And Lactation Text: This medication is Pregnancy Category X and is considered extremely dangerous during pregnancy. It is unknown if it is excreted in breast milk.
Valtrex Pregnancy And Lactation Text: this medication is Pregnancy Category B and is considered safe during pregnancy. This medication is not directly found in breast milk but it's metabolite acyclovir is present.
Thalidomide Pregnancy And Lactation Text: This medication is Pregnancy Category X and is absolutely contraindicated during pregnancy. It is unknown if it is excreted in breast milk.
Albendazole Pregnancy And Lactation Text: This medication is Pregnancy Category C and it isn't known if it is safe during pregnancy. It is also excreted in breast milk.
Otezla Counseling: The side effects of Otezla were discussed with the patient, including but not limited to worsening or new depression, weight loss, diarrhea, nausea, upper respiratory tract infection, and headache. Patient instructed to call the office should any adverse effect occur.  The patient verbalized understanding of the proper use and possible adverse effects of Otezla.  All the patient's questions and concerns were addressed.
Eucrisa Pregnancy And Lactation Text: This medication has not been assigned a Pregnancy Risk Category but animal studies failed to show danger with the topical medication. It is unknown if the medication is excreted in breast milk.
Prednisone Pregnancy And Lactation Text: This medication is Pregnancy Category C and it isn't know if it is safe during pregnancy. This medication is excreted in breast milk.
Isotretinoin Counseling: Patient should get monthly blood tests, not donate blood, not drive at night if vision affected, not share medication, and not undergo elective surgery for 6 months after tx completed. Side effects reviewed, pt to contact office should one occur.
Dupixent Pregnancy And Lactation Text: This medication likely crosses the placenta but the risk for the fetus is uncertain. This medication is excreted in breast milk.
Infliximab Counseling:  I discussed with the patient the risks of infliximab including but not limited to myelosuppression, immunosuppression, autoimmune hepatitis, demyelinating diseases, lymphoma, and serious infections.  The patient understands that monitoring is required including a PPD at baseline and must alert us or the primary physician if symptoms of infection or other concerning signs are noted.
Glycopyrrolate Pregnancy And Lactation Text: This medication is Pregnancy Category B and is considered safe during pregnancy. It is unknown if it is excreted breast milk.
Dapsone Pregnancy And Lactation Text: This medication is Pregnancy Category C and is not considered safe during pregnancy or breast feeding.
Imiquimod Counseling:  I discussed with the patient the risks of imiquimod including but not limited to erythema, scaling, itching, weeping, crusting, and pain.  Patient understands that the inflammatory response to imiquimod is variable from person to person and was educated regarded proper titration schedule.  If flu-like symptoms develop, patient knows to discontinue the medication and contact us.
Gabapentin Pregnancy And Lactation Text: This medication is Pregnancy Category C and isn't considered safe during pregnancy. It is excreted in breast milk.
Drysol Pregnancy And Lactation Text: This medication is considered safe during pregnancy and breast feeding.
Topical Clindamycin Pregnancy And Lactation Text: This medication is Pregnancy Category B and is considered safe during pregnancy. It is unknown if it is excreted in breast milk.
Zyclara Counseling:  I discussed with the patient the risks of imiquimod including but not limited to erythema, scaling, itching, weeping, crusting, and pain.  Patient understands that the inflammatory response to imiquimod is variable from person to person and was educated regarded proper titration schedule.  If flu-like symptoms develop, patient knows to discontinue the medication and contact us.
Zyclara Pregnancy And Lactation Text: This medication is Pregnancy Category C. It is unknown if this medication is excreted in breast milk.
Doxepin Counseling:  Patient advised that the medication is sedating and not to drive a car after taking this medication. Patient informed of potential adverse effects including but not limited to dry mouth, urinary retention, and blurry vision.  The patient verbalized understanding of the proper use and possible adverse effects of doxepin.  All of the patient's questions and concerns were addressed.
Oxybutynin Counseling:  I discussed with the patient the risks of oxybutynin including but not limited to skin rash, drowsiness, dry mouth, difficulty urinating, and blurred vision.
Methotrexate Counseling:  Patient counseled regarding adverse effects of methotrexate including but not limited to nausea, vomiting, abnormalities in liver function tests. Patients may develop mouth sores, rash, diarrhea, and abnormalities in blood counts. The patient understands that monitoring is required including LFT's and blood counts.  There is a rare possibility of scarring of the liver and lung problems that can occur when taking methotrexate. Persistent nausea, loss of appetite, pale stools, dark urine, cough, and shortness of breath should be reported immediately. Patient advised to discontinue methotrexate treatment at least three months before attempting to become pregnant.  I discussed the need for folate supplements while taking methotrexate.  These supplements can decrease side effects during methotrexate treatment. The patient verbalized understanding of the proper use and possible adverse effects of methotrexate.  All of the patient's questions and concerns were addressed.
Protopic Counseling: Patient may experience a mild burning sensation during topical application. Protopic is not approved in children less than 2 years of age. There have been case reports of hematologic and skin malignancies in patients using topical calcineurin inhibitors although causality is questionable.
Tremfya Counseling: I discussed with the patient the risks of guselkumab including but not limited to immunosuppression, serious infections, worsening of inflammatory bowel disease and drug reactions.  The patient understands that monitoring is required including a PPD at baseline and must alert us or the primary physician if symptoms of infection or other concerning signs are noted.
Tremfya Pregnancy And Lactation Text: The risk during pregnancy and breastfeeding is uncertain with this medication.
Minoxidil Counseling: Minoxidil is a topical medication which can increase blood flow where it is applied. It is uncertain how this medication increases hair growth. Side effects are uncommon and include stinging and allergic reactions.
Rifampin Pregnancy And Lactation Text: This medication is Pregnancy Category C and it isn't know if it is safe during pregnancy. It is also excreted in breast milk and should not be used if you are breast feeding.
Cephalexin Pregnancy And Lactation Text: This medication is Pregnancy Category B and considered safe during pregnancy.  It is also excreted in breast milk but can be used safely for shorter doses.
Itraconazole Counseling:  I discussed with the patient the risks of itraconazole including but not limited to liver damage, nausea/vomiting, neuropathy, and severe allergy.  The patient understands that this medication is best absorbed when taken with acidic beverages such as non-diet cola or ginger ale.  The patient understands that monitoring is required including baseline LFTs and repeat LFTs at intervals.  The patient understands that they are to contact us or the primary physician if concerning signs are noted.
Birth Control Pills Pregnancy And Lactation Text: This medication should be avoided if pregnant and for the first 30 days post-partum.
Acitretin Counseling:  I discussed with the patient the risks of acitretin including but not limited to hair loss, dry lips/skin/eyes, liver damage, hyperlipidemia, depression/suicidal ideation, photosensitivity.  Serious rare side effects can include but are not limited to pancreatitis, pseudotumor cerebri, bony changes, clot formation/stroke/heart attack.  Patient understands that alcohol is contraindicated since it can result in liver toxicity and significantly prolong the elimination of the drug by many years.
Azathioprine Pregnancy And Lactation Text: This medication is Pregnancy Category D and isn't considered safe during pregnancy. It is unknown if this medication is excreted in breast milk.
Topical Retinoid counseling:  Patient advised to apply a pea-sized amount only at bedtime and wait 30 minutes after washing their face before applying.  If too drying, patient may add a non-comedogenic moisturizer. The patient verbalized understanding of the proper use and possible adverse effects of retinoids.  All of the patient's questions and concerns were addressed.
Bactrim Pregnancy And Lactation Text: This medication is Pregnancy Category D and is known to cause fetal risk.  It is also excreted in breast milk.
Solaraze Pregnancy And Lactation Text: This medication is Pregnancy Category B and is considered safe. There is some data to suggest avoiding during the third trimester. It is unknown if this medication is excreted in breast milk.
Drysol Counseling:  I discussed with the patient the risks of drysol/aluminum chloride including but not limited to skin rash, itching, irritation, burning.
Minocycline Counseling: Patient advised regarding possible photosensitivity and discoloration of the teeth, skin, lips, tongue and gums.  Patient instructed to avoid sunlight, if possible.  When exposed to sunlight, patients should wear protective clothing, sunglasses, and sunscreen.  The patient was instructed to call the office immediately if the following severe adverse effects occur:  hearing changes, easy bruising/bleeding, severe headache, or vision changes.  The patient verbalized understanding of the proper use and possible adverse effects of minocycline.  All of the patient's questions and concerns were addressed.
Metronidazole Counseling:  I discussed with the patient the risks of metronidazole including but not limited to seizures, nausea/vomiting, a metallic taste in the mouth, nausea/vomiting and severe allergy.
Tazorac Pregnancy And Lactation Text: This medication is not safe during pregnancy. It is unknown if this medication is excreted in breast milk.
Protopic Pregnancy And Lactation Text: This medication is Pregnancy Category C. It is unknown if this medication is excreted in breast milk when applied topically.
Enbrel Counseling:  I discussed with the patient the risks of etanercept including but not limited to myelosuppression, immunosuppression, autoimmune hepatitis, demyelinating diseases, lymphoma, and infections.  The patient understands that monitoring is required including a PPD at baseline and must alert us or the primary physician if symptoms of infection or other concerning signs are noted.
Nsaids Counseling: NSAID Counseling: I discussed with the patient that NSAIDs should be taken with food. Prolonged use of NSAIDs can result in the development of stomach ulcers.  Patient advised to stop taking NSAIDs if abdominal pain occurs.  The patient verbalized understanding of the proper use and possible adverse effects of NSAIDs.  All of the patient's questions and concerns were addressed.
Azithromycin Pregnancy And Lactation Text: This medication is considered safe during pregnancy and is also secreted in breast milk.
Xeljanz Counseling: I discussed with the patient the risks of Xeljanz therapy including increased risk of infection, liver issues, headache, diarrhea, or cold symptoms. Live vaccines should be avoided. They were instructed to call if they have any problems.
Doxepin Pregnancy And Lactation Text: This medication is Pregnancy Category C and it isn't known if it is safe during pregnancy. It is also excreted in breast milk and breast feeding isn't recommended.
Fluconazole Pregnancy And Lactation Text: This medication is Pregnancy Category C and it isn't know if it is safe during pregnancy. It is also excreted in breast milk.
Elidel Counseling: Patient may experience a mild burning sensation during topical application. Elidel is not approved in children less than 2 years of age. There have been case reports of hematologic and skin malignancies in patients using topical calcineurin inhibitors although causality is questionable.
Terbinafine Pregnancy And Lactation Text: This medication is Pregnancy Category B and is considered safe during pregnancy. It is also excreted in breast milk and breast feeding isn't recommended.
Sski Pregnancy And Lactation Text: This medication is Pregnancy Category D and isn't considered safe during pregnancy. It is excreted in breast milk.
Hydroquinone Counseling:  Patient advised that medication may result in skin irritation, lightening (hypopigmentation), dryness, and burning.  In the event of skin irritation, the patient was advised to reduce the amount of the drug applied or use it less frequently.  Rarely, spots that are treated with hydroquinone can become darker (pseudoochronosis).  Should this occur, patient instructed to stop medication and call the office. The patient verbalized understanding of the proper use and possible adverse effects of hydroquinone.  All of the patient's questions and concerns were addressed.
Benzoyl Peroxide Counseling: Patient counseled that medicine may cause skin irritation and bleach clothing.  In the event of skin irritation, the patient was advised to reduce the amount of the drug applied or use it less frequently.   The patient verbalized understanding of the proper use and possible adverse effects of benzoyl peroxide.  All of the patient's questions and concerns were addressed.
Gabapentin Counseling: I discussed with the patient the risks of gabapentin including but not limited to dizziness, somnolence, fatigue and ataxia.
Topical Clindamycin Counseling: Patient counseled that this medication may cause skin irritation or allergic reactions.  In the event of skin irritation, the patient was advised to reduce the amount of the drug applied or use it less frequently.   The patient verbalized understanding of the proper use and possible adverse effects of clindamycin.  All of the patient's questions and concerns were addressed.
Erythromycin Counseling:  I discussed with the patient the risks of erythromycin including but not limited to GI upset, allergic reaction, drug rash, diarrhea, increase in liver enzymes, and yeast infections.
5-Fu Counseling: 5-Fluorouracil Counseling:  I discussed with the patient the risks of 5-fluorouracil including but not limited to erythema, scaling, itching, weeping, crusting, and pain.
Eucrisa Counseling: Patient may experience a mild burning sensation during topical application. Eucrisa is not approved in children less than 2 years of age.
Rifampin Counseling: I discussed with the patient the risks of rifampin including but not limited to liver damage, kidney damage, red-orange body fluids, nausea/vomiting and severe allergy.
Cimzia Counseling:  I discussed with the patient the risks of Cimzia including but not limited to immunosuppression, allergic reactions and infections.  The patient understands that monitoring is required including a PPD at baseline and must alert us or the primary physician if symptoms of infection or other concerning signs are noted.
Prednisone Counseling:  I discussed with the patient the risks of prolonged use of prednisone including but not limited to weight gain, insomnia, osteoporosis, mood changes, diabetes, susceptibility to infection, glaucoma and high blood pressure.  In cases where prednisone use is prolonged, patients should be monitored with blood pressure checks, serum glucose levels and an eye exam.  Additionally, the patient may need to be placed on GI prophylaxis, PCP prophylaxis, and calcium and vitamin D supplementation and/or a bisphosphonate.  The patient verbalized understanding of the proper use and the possible adverse effects of prednisone.  All of the patient's questions and concerns were addressed.
Hydroxyzine Pregnancy And Lactation Text: This medication is not safe during pregnancy and should not be taken. It is also excreted in breast milk and breast feeding isn't recommended.
Topical Sulfur Applications Counseling: Topical Sulfur Counseling: Patient counseled that this medication may cause skin irritation or allergic reactions.  In the event of skin irritation, the patient was advised to reduce the amount of the drug applied or use it less frequently.   The patient verbalized understanding of the proper use and possible adverse effects of topical sulfur application.  All of the patient's questions and concerns were addressed.
Include Pregnancy/Lactation Warning?: No
Odomzo Counseling- I discussed with the patient the risks of Odomzo including but not limited to nausea, vomiting, diarrhea, constipation, weight loss, changes in the sense of taste, decreased appetite, muscle spasms, and hair loss.  The patient verbalized understanding of the proper use and possible adverse effects of Odomzo.  All of the patient's questions and concerns were addressed.
Acitretin Pregnancy And Lactation Text: This medication is Pregnancy Category X and should not be given to women who are pregnant or may become pregnant in the future. This medication is excreted in breast milk.
Fluconazole Counseling:  Patient counseled regarding adverse effects of fluconazole including but not limited to headache, diarrhea, nausea, upset stomach, liver function test abnormalities, taste disturbance, and stomach pain.  There is a rare possibility of liver failure that can occur when taking fluconazole.  The patient understands that monitoring of LFTs and kidney function test may be required, especially at baseline. The patient verbalized understanding of the proper use and possible adverse effects of fluconazole.  All of the patient's questions and concerns were addressed.
Tetracycline Counseling: Patient counseled regarding possible photosensitivity and increased risk for sunburn.  Patient instructed to avoid sunlight, if possible.  When exposed to sunlight, patients should wear protective clothing, sunglasses, and sunscreen.  The patient was instructed to call the office immediately if the following severe adverse effects occur:  hearing changes, easy bruising/bleeding, severe headache, or vision changes.  The patient verbalized understanding of the proper use and possible adverse effects of tetracycline.  All of the patient's questions and concerns were addressed. Patient understands to avoid pregnancy while on therapy due to potential birth defects.
Cosentyx Counseling:  I discussed with the patient the risks of Cosentyx including but not limited to worsening of Crohn's disease, immunosuppression, allergic reactions and infections.  The patient understands that monitoring is required including a PPD at baseline and must alert us or the primary physician if symptoms of infection or other concerning signs are noted.
Valtrex Counseling: I discussed with the patient the risks of valacyclovir including but not limited to kidney damage, nausea, vomiting and severe allergy.  The patient understands that if the infection seems to be worsening or is not improving, they are to call.
High Dose Vitamin A Pregnancy And Lactation Text: High dose vitamin A therapy is contraindicated during pregnancy and breast feeding.
Solaraze Counseling:  I discussed with the patient the risks of Solaraze including but not limited to erythema, scaling, itching, weeping, crusting, and pain.
Xolair Pregnancy And Lactation Text: This medication is Pregnancy Category B and is considered safe during pregnancy. This medication is excreted in breast milk.
Tazorac Counseling:  Patient advised that medication is irritating and drying.  Patient may need to apply sparingly and wash off after an hour before eventually leaving it on overnight.  The patient verbalized understanding of the proper use and possible adverse effects of tazorac.  All of the patient's questions and concerns were addressed.
Clindamycin Counseling: I counseled the patient regarding use of clindamycin as an antibiotic for prophylactic and/or therapeutic purposes. Clindamycin is active against numerous classes of bacteria, including skin bacteria. Side effects may include nausea, diarrhea, gastrointestinal upset, rash, hives, yeast infections, and in rare cases, colitis.
Cyclophosphamide Pregnancy And Lactation Text: This medication is Pregnancy Category D and it isn't considered safe during pregnancy. This medication is excreted in breast milk.
Taltz Counseling: I discussed with the patient the risks of ixekizumab including but not limited to immunosuppression, serious infections, worsening of inflammatory bowel disease and drug reactions.  The patient understands that monitoring is required including a PPD at baseline and must alert us or the primary physician if symptoms of infection or other concerning signs are noted.
Carac Counseling:  I discussed with the patient the risks of Carac including but not limited to erythema, scaling, itching, weeping, crusting, and pain.
Doxycycline Counseling:  Patient counseled regarding possible photosensitivity and increased risk for sunburn.  Patient instructed to avoid sunlight, if possible.  When exposed to sunlight, patients should wear protective clothing, sunglasses, and sunscreen.  The patient was instructed to call the office immediately if the following severe adverse effects occur:  hearing changes, easy bruising/bleeding, severe headache, or vision changes.  The patient verbalized understanding of the proper use and possible adverse effects of doxycycline.  All of the patient's questions and concerns were addressed.
Otezla Pregnancy And Lactation Text: This medication is Pregnancy Category C and it isn't known if it is safe during pregnancy. It is unknown if it is excreted in breast milk.
Azithromycin Counseling:  I discussed with the patient the risks of azithromycin including but not limited to GI upset, allergic reaction, drug rash, diarrhea, and yeast infections.
Simponi Counseling:  I discussed with the patient the risks of golimumab including but not limited to myelosuppression, immunosuppression, autoimmune hepatitis, demyelinating diseases, lymphoma, and serious infections.  The patient understands that monitoring is required including a PPD at baseline and must alert us or the primary physician if symptoms of infection or other concerning signs are noted.
Minocycline Pregnancy And Lactation Text: This medication is Pregnancy Category D and not consider safe during pregnancy. It is also excreted in breast milk.
Albendazole Counseling:  I discussed with the patient the risks of albendazole including but not limited to cytopenia, kidney damage, nausea/vomiting and severe allergy.  The patient understands that this medication is being used in an off-label manner.
Griseofulvin Counseling:  I discussed with the patient the risks of griseofulvin including but not limited to photosensitivity, cytopenia, liver damage, nausea/vomiting and severe allergy.  The patient understands that this medication is best absorbed when taken with a fatty meal (e.g., ice cream or french fries).
High Dose Vitamin A Counseling: Side effects reviewed, pt to contact office should one occur.
Picato Counseling:  I discussed with the patient the risks of Picato including but not limited to erythema, scaling, itching, weeping, crusting, and pain.
Benzoyl Peroxide Pregnancy And Lactation Text: This medication is Pregnancy Category C. It is unknown if benzoyl peroxide is excreted in breast milk.
Bexarotene Pregnancy And Lactation Text: This medication is Pregnancy Category X and should not be given to women who are pregnant or may become pregnant. This medication should not be used if you are breast feeding.
Ketoconazole Counseling:   Patient counseled regarding improving absorption with orange juice.  Adverse effects include but are not limited to breast enlargement, headache, diarrhea, nausea, upset stomach, liver function test abnormalities, taste disturbance, and stomach pain.  There is a rare possibility of liver failure that can occur when taking ketoconazole. The patient understands that monitoring of LFTs may be required, especially at baseline. The patient verbalized understanding of the proper use and possible adverse effects of ketoconazole.  All of the patient's questions and concerns were addressed.
Ivermectin Counseling:  Patient instructed to take medication on an empty stomach with a full glass of water.  Patient informed of potential adverse effects including but not limited to nausea, diarrhea, dizziness, itching, and swelling of the extremities or lymph nodes.  The patient verbalized understanding of the proper use and possible adverse effects of ivermectin.  All of the patient's questions and concerns were addressed.
Cephalexin Counseling: I counseled the patient regarding use of cephalexin as an antibiotic for prophylactic and/or therapeutic purposes. Cephalexin (commonly prescribed under brand name Keflex) is a cephalosporin antibiotic which is active against numerous classes of bacteria, including most skin bacteria. Side effects may include nausea, diarrhea, gastrointestinal upset, rash, hives, yeast infections, and in rare cases, hepatitis, kidney disease, seizures, fever, confusion, neurologic symptoms, and others. Patients with severe allergies to penicillin medications are cautioned that there is about a 10% incidence of cross-reactivity with cephalosporins. When possible, patients with penicillin allergies should use alternatives to cephalosporins for antibiotic therapy.
Spironolactone Pregnancy And Lactation Text: This medication can cause feminization of the male fetus and should be avoided during pregnancy. The active metabolite is also found in breast milk.
Topical Sulfur Applications Pregnancy And Lactation Text: This medication is Pregnancy Category C and has an unknown safety profile during pregnancy. It is unknown if this topical medication is excreted in breast milk.
Methotrexate Pregnancy And Lactation Text: This medication is Pregnancy Category X and is known to cause fetal harm. This medication is excreted in breast milk.
Hydroxychloroquine Counseling:  I discussed with the patient that a baseline ophthalmologic exam is needed at the start of therapy and every year thereafter while on therapy. A CBC may also be warranted for monitoring.  The side effects of this medication were discussed with the patient, including but not limited to agranulocytosis, aplastic anemia, seizures, rashes, retinopathy, and liver toxicity. Patient instructed to call the office should any adverse effect occur.  The patient verbalized understanding of the proper use and possible adverse effects of Plaquenil.  All the patient's questions and concerns were addressed.
Xeldrewz Pregnancy And Lactation Text: This medication is Pregnancy Category D and is not considered safe during pregnancy.  The risk during breast feeding is also uncertain.
Bexarotene Counseling:  I discussed with the patient the risks of bexarotene including but not limited to hair loss, dry lips/skin/eyes, liver abnormalities, hyperlipidemia, pancreatitis, depression/suicidal ideation, photosensitivity, drug rash/allergic reactions, hypothyroidism, anemia, leukopenia, infection, cataracts, and teratogenicity.  Patient understands that they will need regular blood tests to check lipid profile, liver function tests, white blood cell count, thyroid function tests and pregnancy test if applicable.
Humira Counseling:  I discussed with the patient the risks of adalimumab including but not limited to myelosuppression, immunosuppression, autoimmune hepatitis, demyelinating diseases, lymphoma, and serious infections.  The patient understands that monitoring is required including a PPD at baseline and must alert us or the primary physician if symptoms of infection or other concerning signs are noted.
Rituxan Counseling:  I discussed with the patient the risks of Rituxan infusions. Side effects can include infusion reactions, severe drug rashes including mucocutaneous reactions, reactivation of latent hepatitis and other infections and rarely progressive multifocal leukoencephalopathy.  All of the patient's questions and concerns were addressed.
Erivedge Counseling- I discussed with the patient the risks of Erivedge including but not limited to nausea, vomiting, diarrhea, constipation, weight loss, changes in the sense of taste, decreased appetite, muscle spasms, and hair loss.  The patient verbalized understanding of the proper use and possible adverse effects of Erivedge.  All of the patient's questions and concerns were addressed.
5-Fu Pregnancy And Lactation Text: This medication is Pregnancy Category X and contraindicated in pregnancy and in women who may become pregnant. It is unknown if this medication is excreted in breast milk.
Siliq Counseling:  I discussed with the patient the risks of Siliq including but not limited to new or worsening depression, suicidal thoughts and behavior, immunosuppression, malignancy, posterior leukoencephalopathy syndrome, and serious infections.  The patient understands that monitoring is required including a PPD at baseline and must alert us or the primary physician if symptoms of infection or other concerning signs are noted. There is also a special program designed to monitor depression which is required with Siliq.
Rituxan Pregnancy And Lactation Text: This medication is Pregnancy Category C and it isn't know if it is safe during pregnancy. It is unknown if this medication is excreted in breast milk but similar antibodies are known to be excreted.
Cimetidine Counseling:  I discussed with the patient the risks of Cimetidine including but not limited to gynecomastia, headache, diarrhea, nausea, drowsiness, arrhythmias, pancreatitis, skin rashes, psychosis, bone marrow suppression and kidney toxicity.
Quinolones Counseling:  I discussed with the patient the risks of fluoroquinolones including but not limited to GI upset, allergic reaction, drug rash, diarrhea, dizziness, photosensitivity, yeast infections, liver function test abnormalities, tendonitis/tendon rupture.
Cellcept Counseling:  I discussed with the patient the risks of mycophenolate mofetil including but not limited to infection/immunosuppression, GI upset, hypokalemia, hypercholesterolemia, bone marrow suppression, lymphoproliferative disorders, malignancy, GI ulceration/bleed/perforation, colitis, interstitial lung disease, kidney failure, progressive multifocal leukoencephalopathy, and birth defects.  The patient understands that monitoring is required including a baseline creatinine and regular CBC testing. In addition, patient must alert us immediately if symptoms of infection or other concerning signs are noted.
Ketoconazole Pregnancy And Lactation Text: This medication is Pregnancy Category C and it isn't know if it is safe during pregnancy. It is also excreted in breast milk and breast feeding isn't recommended.
Arava Counseling:  Patient counseled regarding adverse effects of Arava including but not limited to nausea, vomiting, abnormalities in liver function tests. Patients may develop mouth sores, rash, diarrhea, and abnormalities in blood counts. The patient understands that monitoring is required including LFTs and blood counts.  There is a rare possibility of scarring of the liver and lung problems that can occur when taking methotrexate. Persistent nausea, loss of appetite, pale stools, dark urine, cough, and shortness of breath should be reported immediately. Patient advised to discontinue Arava treatment and consult with a physician prior to attempting conception. The patient will have to undergo a treatment to eliminate Arava from the body prior to conception.
Bactrim Counseling:  I discussed with the patient the risks of sulfa antibiotics including but not limited to GI upset, allergic reaction, drug rash, diarrhea, dizziness, photosensitivity, and yeast infections.  Rarely, more serious reactions can occur including but not limited to aplastic anemia, agranulocytosis, methemoglobinemia, blood dyscrasias, liver or kidney failure, lung infiltrates or desquamative/blistering drug rashes.
Terbinafine Counseling: Patient counseling regarding adverse effects of terbinafine including but not limited to headache, diarrhea, rash, upset stomach, liver function test abnormalities, itching, taste/smell disturbance, nausea, abdominal pain, and flatulence.  There is a rare possibility of liver failure that can occur when taking terbinafine.  The patient understands that a baseline LFT and kidney function test may be required. The patient verbalized understanding of the proper use and possible adverse effects of terbinafine.  All of the patient's questions and concerns were addressed.
Erythromycin Pregnancy And Lactation Text: This medication is Pregnancy Category B and is considered safe during pregnancy. It is also excreted in breast milk.
Glycopyrrolate Counseling:  I discussed with the patient the risks of glycopyrrolate including but not limited to skin rash, drowsiness, dry mouth, difficulty urinating, and blurred vision.
Xolair Counseling:  Patient informed of potential adverse effects including but not limited to fever, muscle aches, rash and allergic reactions.  The patient verbalized understanding of the proper use and possible adverse effects of Xolair.  All of the patient's questions and concerns were addressed.
Detail Level: Zone
Spironolactone Counseling: Patient advised regarding risks of diarrhea, abdominal pain, hyperkalemia, birth defects (for female patients), liver toxicity and renal toxicity. The patient may need blood work to monitor liver and kidney function and potassium levels while on therapy. The patient verbalized understanding of the proper use and possible adverse effects of spironolactone.  All of the patient's questions and concerns were addressed.
Dupixent Counseling: I discussed with the patient the risks of dupilumab including but not limited to eye infection and irritation, cold sores, injection site reactions, worsening of asthma, allergic reactions and increased risk of parasitic infection.  Live vaccines should be avoided while taking dupilumab. Dupilumab will also interact with certain medications such as warfarin and cyclosporine. The patient understands that monitoring is required and they must alert us or the primary physician if symptoms of infection or other concerning signs are noted.
Cyclosporine Counseling:  I discussed with the patient the risks of cyclosporine including but not limited to hypertension, gingival hyperplasia,myelosuppression, immunosuppression, liver damage, kidney damage, neurotoxicity, lymphoma, and serious infections. The patient understands that monitoring is required including baseline blood pressure, CBC, CMP, lipid panel and uric acid, and then 1-2 times monthly CMP and blood pressure.
Thalidomide Counseling: I discussed with the patient the risks of thalidomide including but not limited to birth defects, anxiety, weakness, chest pain, dizziness, cough and severe allergy.
Colchicine Counseling:  Patient counseled regarding adverse effects including but not limited to stomach upset (nausea, vomiting, stomach pain, or diarrhea).  Patient instructed to limit alcohol consumption while taking this medication.  Colchicine may reduce blood counts especially with prolonged use.  The patient understands that monitoring of kidney function and blood counts may be required, especially at baseline. The patient verbalized understanding of the proper use and possible adverse effects of colchicine.  All of the patient's questions and concerns were addressed.
Nsaids Pregnancy And Lactation Text: These medications are considered safe up to 30 weeks gestation. It is excreted in breast milk.
Azathioprine Counseling:  I discussed with the patient the risks of azathioprine including but not limited to myelosuppression, immunosuppression, hepatotoxicity, lymphoma, and infections.  The patient understands that monitoring is required including baseline LFTs, Creatinine, possible TPMP genotyping and weekly CBCs for the first month and then every 2 weeks thereafter.  The patient verbalized understanding of the proper use and possible adverse effects of azathioprine.  All of the patient's questions and concerns were addressed.
Hydroxyzine Counseling: Patient advised that the medication is sedating and not to drive a car after taking this medication.  Patient informed of potential adverse effects including but not limited to dry mouth, urinary retention, and blurry vision.  The patient verbalized understanding of the proper use and possible adverse effects of hydroxyzine.  All of the patient's questions and concerns were addressed.
Doxycycline Pregnancy And Lactation Text: This medication is Pregnancy Category D and not consider safe during pregnancy. It is also excreted in breast milk but is considered safe for shorter treatment courses.
Dapsone Counseling: I discussed with the patient the risks of dapsone including but not limited to hemolytic anemia, agranulocytosis, rashes, methemoglobinemia, kidney failure, peripheral neuropathy, headaches, GI upset, and liver toxicity.  Patients who start dapsone require monitoring including baseline LFTs and weekly CBCs for the first month, then every month thereafter.  The patient verbalized understanding of the proper use and possible adverse effects of dapsone.  All of the patient's questions and concerns were addressed.
SSKI Counseling:  I discussed with the patient the risks of SSKI including but not limited to thyroid abnormalities, metallic taste, GI upset, fever, headache, acne, arthralgias, paraesthesias, lymphadenopathy, easy bleeding, arrhythmias, and allergic reaction.
Griseofulvin Pregnancy And Lactation Text: This medication is Pregnancy Category X and is known to cause serious birth defects. It is unknown if this medication is excreted in breast milk but breast feeding should be avoided.
Metronidazole Pregnancy And Lactation Text: This medication is Pregnancy Category B and considered safe during pregnancy.  It is also excreted in breast milk.
Clindamycin Pregnancy And Lactation Text: This medication can be used in pregnancy if certain situations. Clindamycin is also present in breast milk.
Cyclophosphamide Counseling:  I discussed with the patient the risks of cyclophosphamide including but not limited to hair loss, hormonal abnormalities, decreased fertility, abdominal pain, diarrhea, nausea and vomiting, bone marrow suppression and infection. The patient understands that monitoring is required while taking this medication.
Hydroxychloroquine Pregnancy And Lactation Text: This medication has been shown to cause fetal harm but it isn't assigned a Pregnancy Risk Category. There are small amounts excreted in breast milk.
Cimzia Pregnancy And Lactation Text: This medication crosses the placenta but can be considered safe in certain situations. Cimzia may be excreted in breast milk.

## 2018-09-17 NOTE — PROCEDURE: REASSURANCE
Detail Level: Zone
Include Location In Plan?: Yes
Detail Level: Generalized
Include Location In Plan?: No

## 2018-09-17 NOTE — HPI: RASH
How Severe Is Your Rash?: moderate
Is This A New Presentation, Or A Follow-Up?: Rash
Additional History: Currently being treated by PCP

## 2018-10-03 ENCOUNTER — HOSPITAL ENCOUNTER (OUTPATIENT)
Dept: LAB | Facility: MEDICAL CENTER | Age: 53
End: 2018-10-03
Attending: FAMILY MEDICINE
Payer: COMMERCIAL

## 2018-10-03 DIAGNOSIS — R97.20 RISING PSA LEVEL: ICD-10-CM

## 2018-10-03 DIAGNOSIS — K76.0 FATTY LIVER: ICD-10-CM

## 2018-10-03 DIAGNOSIS — R73.03 PREDIABETES: ICD-10-CM

## 2018-10-03 DIAGNOSIS — Z72.89 OTHER PROBLEMS RELATED TO LIFESTYLE: ICD-10-CM

## 2018-10-03 DIAGNOSIS — E78.5 DYSLIPIDEMIA: ICD-10-CM

## 2018-10-03 LAB
ALBUMIN SERPL BCP-MCNC: 4.3 G/DL (ref 3.2–4.9)
ALBUMIN/GLOB SERPL: 1.2 G/DL
ALP SERPL-CCNC: 52 U/L (ref 30–99)
ALT SERPL-CCNC: 23 U/L (ref 2–50)
ANION GAP SERPL CALC-SCNC: 6 MMOL/L (ref 0–11.9)
AST SERPL-CCNC: 21 U/L (ref 12–45)
BILIRUB SERPL-MCNC: 0.9 MG/DL (ref 0.1–1.5)
BUN SERPL-MCNC: 16 MG/DL (ref 8–22)
CALCIUM SERPL-MCNC: 9.6 MG/DL (ref 8.5–10.5)
CHLORIDE SERPL-SCNC: 104 MMOL/L (ref 96–112)
CHOLEST SERPL-MCNC: 196 MG/DL (ref 100–199)
CO2 SERPL-SCNC: 25 MMOL/L (ref 20–33)
CREAT SERPL-MCNC: 0.85 MG/DL (ref 0.5–1.4)
EST. AVERAGE GLUCOSE BLD GHB EST-MCNC: 128 MG/DL
FASTING STATUS PATIENT QL REPORTED: NORMAL
GLOBULIN SER CALC-MCNC: 3.7 G/DL (ref 1.9–3.5)
GLUCOSE SERPL-MCNC: 83 MG/DL (ref 65–99)
HBA1C MFR BLD: 6.1 % (ref 0–5.6)
HDLC SERPL-MCNC: 52 MG/DL
LDLC SERPL CALC-MCNC: 124 MG/DL
POTASSIUM SERPL-SCNC: 4 MMOL/L (ref 3.6–5.5)
PROT SERPL-MCNC: 8 G/DL (ref 6–8.2)
PSA SERPL-MCNC: 3.4 NG/ML (ref 0–4)
SODIUM SERPL-SCNC: 135 MMOL/L (ref 135–145)
TRIGL SERPL-MCNC: 98 MG/DL (ref 0–149)

## 2018-10-03 PROCEDURE — 86803 HEPATITIS C AB TEST: CPT

## 2018-10-03 PROCEDURE — 83036 HEMOGLOBIN GLYCOSYLATED A1C: CPT

## 2018-10-03 PROCEDURE — 36415 COLL VENOUS BLD VENIPUNCTURE: CPT

## 2018-10-03 PROCEDURE — 80053 COMPREHEN METABOLIC PANEL: CPT

## 2018-10-03 PROCEDURE — 80061 LIPID PANEL: CPT

## 2018-10-03 PROCEDURE — 84153 ASSAY OF PSA TOTAL: CPT

## 2018-10-04 LAB — HCV AB SER QL: NEGATIVE

## 2018-10-16 ENCOUNTER — OFFICE VISIT (OUTPATIENT)
Dept: MEDICAL GROUP | Facility: MEDICAL CENTER | Age: 53
End: 2018-10-16
Payer: COMMERCIAL

## 2018-10-16 VITALS
OXYGEN SATURATION: 97 % | SYSTOLIC BLOOD PRESSURE: 114 MMHG | DIASTOLIC BLOOD PRESSURE: 68 MMHG | HEIGHT: 74 IN | BODY MASS INDEX: 40.43 KG/M2 | HEART RATE: 72 BPM | RESPIRATION RATE: 16 BRPM | WEIGHT: 315 LBS | TEMPERATURE: 98.1 F

## 2018-10-16 DIAGNOSIS — R73.03 PREDIABETES: ICD-10-CM

## 2018-10-16 DIAGNOSIS — M25.552 LEFT HIP PAIN: ICD-10-CM

## 2018-10-16 DIAGNOSIS — R97.20 RISING PSA LEVEL: ICD-10-CM

## 2018-10-16 DIAGNOSIS — L91.8 ACROCHORDON: ICD-10-CM

## 2018-10-16 DIAGNOSIS — E78.5 DYSLIPIDEMIA: ICD-10-CM

## 2018-10-16 PROBLEM — R21 RASH: Status: ACTIVE | Noted: 2018-09-04

## 2018-10-16 PROCEDURE — 99214 OFFICE O/P EST MOD 30 MIN: CPT | Mod: 25 | Performed by: FAMILY MEDICINE

## 2018-10-16 PROCEDURE — 11200 RMVL SKIN TAGS UP TO&INC 15: CPT | Performed by: FAMILY MEDICINE

## 2018-10-17 NOTE — ASSESSMENT & PLAN NOTE
The patient continues to complain of left lateral hip pain. He has not yet tried the Salonpas patch.

## 2018-10-17 NOTE — ASSESSMENT & PLAN NOTE
At the last visit we did some cryotherapy on some skin tags. The patient states this worked very well, however, he does describe some additional bothersome skin tags. He would like these removed. They bother him as they tend to catch on clothing and other items.

## 2018-10-17 NOTE — PROGRESS NOTES
[facility]  Progress Note  Established Patient    Subjective:   Tommy Guillen is a 53 y.o. male here today with a chief complaint of skin tags and prediabetes. The patient is alone.     Melly  At the last visit we did some cryotherapy on some skin tags. The patient states this worked very well, however, he does describe some additional bothersome skin tags. He would like these removed. They bother him as they tend to catch on clothing and other items.    Dyslipidemia  The patient has a slight dyslipidemia.    Left hip pain  The patient continues to complain of left lateral hip pain. He has not yet tried the Salonpas patch.    Prediabetes  Patient continues to demonstrate prediabetes on lab work.    Rising PSA level  This issue has resolved. His PSA remains stable.      No current outpatient prescriptions on file prior to visit.     No current facility-administered medications on file prior to visit.        Past Medical History:   Diagnosis Date   • Asymptomatic cholelithiasis 2012   • Colon polyps 2012    [facility]   • Family history of colon cancer    • Morbid obesity (HCC) 7/2/2013       Allergies: Patient has no known allergies.    Surgical History:  has a past surgical history that includes claudine rectal abscess (1990s) and colonoscopy (2011).    Family History: family history includes Cancer in his father; Diabetes in his sister; Heart Disease in his maternal uncle; Hyperlipidemia in his father; Hypertension in his father; Lung Disease in his father and paternal uncle; Stroke in his father.    Social History:  reports that he has never smoked. He has never used smokeless tobacco. He reports that he drinks about 0.6 oz of alcohol per week . He reports that he does not use drugs.    ROS: no fever or nausea.        Objective:     Vitals:    10/16/18 1646   BP: 114/68   BP Location: Left arm   Patient Position: Sitting   BP Cuff Size: Large adult   Pulse: 72   Resp: 16   Temp: 36.7 °C (98.1  "°F)   TempSrc: Temporal   SpO2: 97%   Weight: (!) 145.6 kg (321 lb)   Height: 1.88 m (6' 2\")       Physical Exam:  General: alert in no apparent distress.   Skin: There is a small skin tag at the nape of the left neck. There are 2 large skin tags in the left axilla and 1 large skin tag in the right axilla. There is one large skin tag in the R inguinal region.  MSK: L trochanteric bursal tenderness.     Cryotherapy Procedure Note:  I discussed the risks and benefits of cryotherapy with the patient who gave verbal consent for the procedure. Three applications of cryotherapy were applied to the skin tags described above. The patient tolerated the procedure well and there were no complications. Aftercare was discussed.      Assessment and Plan:     1. Prediabetes  This is an established and stable problem.  - discussed diet, exercise and weight loss.     2. Dyslipidemia  This is an established and stable problem..   - discussed diet, exercise and weight loss.     3. Acrochordon  - cryotherapy (see procedure note above).     4. Left hip pain  C/w trochanteric bursitis. Hasn't yet tried Salonpas.   - recommended salonpas, if no improvement may then consider injection.     5. Rising PSA level  - resolved.         Followup: Return in about 1 year (around 10/16/2019), or if symptoms worsen or fail to improve.         "

## 2019-10-07 ENCOUNTER — OFFICE VISIT (OUTPATIENT)
Dept: MEDICAL GROUP | Facility: MEDICAL CENTER | Age: 54
End: 2019-10-07
Payer: COMMERCIAL

## 2019-10-07 VITALS
SYSTOLIC BLOOD PRESSURE: 118 MMHG | WEIGHT: 315 LBS | TEMPERATURE: 97.8 F | HEART RATE: 98 BPM | BODY MASS INDEX: 40.43 KG/M2 | RESPIRATION RATE: 16 BRPM | DIASTOLIC BLOOD PRESSURE: 82 MMHG | OXYGEN SATURATION: 94 % | HEIGHT: 74 IN

## 2019-10-07 DIAGNOSIS — R73.03 PREDIABETES: ICD-10-CM

## 2019-10-07 DIAGNOSIS — Z00.00 HEALTHCARE MAINTENANCE: ICD-10-CM

## 2019-10-07 DIAGNOSIS — Z23 NEED FOR VACCINATION: ICD-10-CM

## 2019-10-07 DIAGNOSIS — E78.5 DYSLIPIDEMIA: ICD-10-CM

## 2019-10-07 PROCEDURE — 90472 IMMUNIZATION ADMIN EACH ADD: CPT | Performed by: FAMILY MEDICINE

## 2019-10-07 PROCEDURE — 90471 IMMUNIZATION ADMIN: CPT | Performed by: FAMILY MEDICINE

## 2019-10-07 PROCEDURE — 90686 IIV4 VACC NO PRSV 0.5 ML IM: CPT | Performed by: FAMILY MEDICINE

## 2019-10-07 PROCEDURE — 90715 TDAP VACCINE 7 YRS/> IM: CPT | Performed by: FAMILY MEDICINE

## 2019-10-07 PROCEDURE — 99396 PREV VISIT EST AGE 40-64: CPT | Mod: 25 | Performed by: FAMILY MEDICINE

## 2019-10-07 ASSESSMENT — PATIENT HEALTH QUESTIONNAIRE - PHQ9: CLINICAL INTERPRETATION OF PHQ2 SCORE: 0

## 2019-10-07 NOTE — PROGRESS NOTES
"[facility]  Progress Note  Established Patient    Subjective:   Tmomy Guillen is a 54 y.o. male here today for a wellness exam. The patient is alone.     Healthcare maintenance  Lipids: done 2018.   Fasting Glucose: done 2018.    Hepatitis C Screen: done 2018 and normal.   Colonoscopy: due 2021.     Td: given 10/07/19.   Flu shot: given 10/07/19.     Dyslipidemia  Patient has a slight dyslipidemia with a coronary calcium score of 0 a few years ago.    Prediabetes  Noted on past labs.  Most recent fasting glucose has been normal.      No current outpatient medications on file prior to visit.     No current facility-administered medications on file prior to visit.        Past Medical History:   Diagnosis Date   • Asymptomatic cholelithiasis 2012   • Colon polyps 2012    [facility]   • Family history of colon cancer    • Morbid obesity (HCC) 7/2/2013       Allergies: Patient has no known allergies.    Surgical History:  has a past surgical history that includes claudine rectal abscess (1990s) and colonoscopy (2011).    Family History: family history includes Cancer in his father; Diabetes in his sister; Heart Disease in his maternal uncle; Hyperlipidemia in his father; Hypertension in his father; Lung Disease in his father and paternal uncle; Stroke in his father.    Social History:  reports that he has never smoked. He has never used smokeless tobacco. He reports that he drinks about 0.6 oz of alcohol per week. He reports that he does not use drugs.    ROS: no CP or SOB.        Objective:     Vitals:    10/07/19 1536   BP: 118/82   BP Location: Left arm   Patient Position: Sitting   BP Cuff Size: Large adult   Pulse: 98   Resp: 16   Temp: 36.6 °C (97.8 °F)   TempSrc: Temporal   SpO2: 94%   Weight: (!) 144.4 kg (318 lb 6.4 oz)   Height: 1.88 m (6' 2\")       Physical Exam:  General: alert in no apparent distress.   Cardio: regular rate and rhythm, no murmurs, rubs or gallops.   Resp: CTAB no w/r/r. "         Assessment and Plan:     1. Need for vaccination  - Influenza Vaccine Quad Injection (PF)  - TDAP VACCINE =>8YO IM    2. Dyslipidemia  - diet and exercise.     3. Prediabetes  - diet and exercise.     4. Healthcare maintenance  - see HPI.   - discussed diet and exercise.   - immunizations as above.         Followup: Return in about 1 year (around 10/7/2020), or if symptoms worsen or fail to improve, for Wellness Visit, Long.

## 2019-10-07 NOTE — ASSESSMENT & PLAN NOTE
Lipids: done 2018.   Fasting Glucose: done 2018.    Hepatitis C Screen: done 2018 and normal.   Colonoscopy: due 2021.     Td: given 10/07/19.   Flu shot: given 10/07/19.

## 2020-05-20 ENCOUNTER — HOSPITAL ENCOUNTER (OUTPATIENT)
Facility: MEDICAL CENTER | Age: 55
End: 2020-05-20
Payer: COMMERCIAL

## 2020-05-22 LAB
SARS-COV-2 RNA SPEC QL NAA+PROBE: NOT DETECTED
SPECIMEN SOURCE: NORMAL

## 2020-10-09 ENCOUNTER — OFFICE VISIT (OUTPATIENT)
Dept: MEDICAL GROUP | Facility: MEDICAL CENTER | Age: 55
End: 2020-10-09
Payer: COMMERCIAL

## 2020-10-09 VITALS
RESPIRATION RATE: 20 BRPM | HEIGHT: 74 IN | TEMPERATURE: 97.5 F | BODY MASS INDEX: 38.94 KG/M2 | OXYGEN SATURATION: 97 % | HEART RATE: 71 BPM | SYSTOLIC BLOOD PRESSURE: 122 MMHG | DIASTOLIC BLOOD PRESSURE: 70 MMHG | WEIGHT: 303.4 LBS

## 2020-10-09 DIAGNOSIS — R73.03 PREDIABETES: ICD-10-CM

## 2020-10-09 DIAGNOSIS — E66.01 MORBID OBESITY DUE TO EXCESS CALORIES (HCC): ICD-10-CM

## 2020-10-09 DIAGNOSIS — Z00.00 HEALTHCARE MAINTENANCE: ICD-10-CM

## 2020-10-09 DIAGNOSIS — E78.5 DYSLIPIDEMIA: ICD-10-CM

## 2020-10-09 PROBLEM — E55.9 VITAMIN D DEFICIENCY: Status: RESOLVED | Noted: 2017-07-06 | Resolved: 2020-10-09

## 2020-10-09 PROBLEM — L91.8 ACROCHORDON: Status: RESOLVED | Noted: 2018-09-04 | Resolved: 2020-10-09

## 2020-10-09 PROBLEM — R21 RASH: Status: RESOLVED | Noted: 2018-09-04 | Resolved: 2020-10-09

## 2020-10-09 PROBLEM — L98.9 SKIN LESION OF SCALP: Status: RESOLVED | Noted: 2017-09-06 | Resolved: 2020-10-09

## 2020-10-09 PROBLEM — M25.552 LEFT HIP PAIN: Status: RESOLVED | Noted: 2018-09-04 | Resolved: 2020-10-09

## 2020-10-09 PROBLEM — N28.1 SIMPLE RENAL CYST: Status: RESOLVED | Noted: 2017-07-06 | Resolved: 2020-10-09

## 2020-10-09 PROCEDURE — 99396 PREV VISIT EST AGE 40-64: CPT | Performed by: FAMILY MEDICINE

## 2020-10-09 ASSESSMENT — PATIENT HEALTH QUESTIONNAIRE - PHQ9: CLINICAL INTERPRETATION OF PHQ2 SCORE: 0

## 2020-10-09 NOTE — ASSESSMENT & PLAN NOTE
Patient is obese but has been successful losing about 15 pounds over the past year.  He is making small changes in his life to try to achieve some weight loss.

## 2020-10-09 NOTE — PROGRESS NOTES
"[facility]  Progress Note  Established Patient    Subjective:   Tommy Guillen is a 55 y.o. male here today for a wellness exam. The patient is alone.     Dyslipidemia  Noted on past labs.    Healthcare maintenance  Lipids: Ordered.  Fasting Glucose: Ordered.  Hepatitis C Screen: done 2018 and normal.   Colonoscopy: due 2021.     Td: given 10/07/19.   Flu shot: 2020.    Morbid obesity due to excess calories (CMS-HCC)  Patient is obese but has been successful losing about 15 pounds over the past year.  He is making small changes in his life to try to achieve some weight loss.    Prediabetes  Noted on past labs.      No current outpatient medications on file prior to visit.     No current facility-administered medications on file prior to visit.        Past Medical History:   Diagnosis Date   • Asymptomatic cholelithiasis 2012   • Colon polyps 2012    GI consultants   • Family history of colon cancer    • Morbid obesity (HCC) 7/2/2013       Allergies: Patient has no known allergies.    Surgical History:  has a past surgical history that includes claudine rectal abscess (1990s) and colonoscopy (2011).    Family History: family history includes Cancer in his father; Diabetes in his sister; Heart Disease in his maternal uncle; Hyperlipidemia in his father; Hypertension in his father; Lung Disease in his father and paternal uncle; Stroke in his father.    Social History:  reports that he has never smoked. He has never used smokeless tobacco. He reports current alcohol use of about 0.6 oz of alcohol per week. He reports that he does not use drugs.    ROS: No fever.        Objective:     Vitals:    10/09/20 0800   BP: 122/70   BP Location: Left arm   Patient Position: Sitting   Pulse: 71   Resp: 20   Temp: 36.4 °C (97.5 °F)   TempSrc: Temporal   SpO2: 97%   Weight: (!) 137.6 kg (303 lb 6.4 oz)   Height: 1.88 m (6' 2\")       Physical Exam:  General: alert in no apparent distress.   Cardio: regular rate and rhythm, no " murmurs, rubs or gallops.   Resp: CTAB no w/r/r.         Assessment and Plan:     1. Dyslipidemia  - Lipid Profile; Future    2. Healthcare maintenance  -See HPI.  -Age-appropriate anticipatory guidance discussed including diet and exercise.  -Immunization vaccine received this year.  - Comp Metabolic Panel; Future  - Lipid Profile; Future  - HEMOGLOBIN A1C; Future  - PROSTATE SPECIFIC AG SCREENING; Future    3. Morbid obesity due to excess calories (HCC)  Commended him on 15 pound weight loss and encouraged additional weight loss.    4. Prediabetes  - Comp Metabolic Panel; Future  - HEMOGLOBIN A1C; Future        Followup: Return in about 1 year (around 10/9/2021), or if symptoms worsen or fail to improve, for Wellness Visit, Long.

## 2020-10-09 NOTE — ASSESSMENT & PLAN NOTE
Lipids: Ordered.  Fasting Glucose: Ordered.  Hepatitis C Screen: done 2018 and normal.   Colonoscopy: due 2021.     Td: given 10/07/19.   Flu shot: 2020.

## 2021-03-15 DIAGNOSIS — Z23 NEED FOR VACCINATION: ICD-10-CM

## 2021-03-23 ENCOUNTER — IMMUNIZATION (OUTPATIENT)
Dept: FAMILY PLANNING/WOMEN'S HEALTH CLINIC | Facility: IMMUNIZATION CENTER | Age: 56
End: 2021-03-23
Attending: INTERNAL MEDICINE
Payer: COMMERCIAL

## 2021-03-23 DIAGNOSIS — Z23 ENCOUNTER FOR VACCINATION: Primary | ICD-10-CM

## 2021-03-23 DIAGNOSIS — Z23 NEED FOR VACCINATION: ICD-10-CM

## 2021-03-23 PROCEDURE — 0001A PFIZER SARS-COV-2 VACCINE: CPT

## 2021-03-23 PROCEDURE — 91300 PFIZER SARS-COV-2 VACCINE: CPT

## 2021-04-14 ENCOUNTER — IMMUNIZATION (OUTPATIENT)
Dept: FAMILY PLANNING/WOMEN'S HEALTH CLINIC | Facility: IMMUNIZATION CENTER | Age: 56
End: 2021-04-14
Attending: INTERNAL MEDICINE
Payer: COMMERCIAL

## 2021-04-14 DIAGNOSIS — Z23 ENCOUNTER FOR VACCINATION: Primary | ICD-10-CM

## 2021-04-14 PROCEDURE — 0002A PFIZER SARS-COV-2 VACCINE: CPT

## 2021-04-14 PROCEDURE — 91300 PFIZER SARS-COV-2 VACCINE: CPT

## 2021-07-13 ENCOUNTER — OFFICE VISIT (OUTPATIENT)
Dept: MEDICAL GROUP | Facility: MEDICAL CENTER | Age: 56
End: 2021-07-13
Payer: COMMERCIAL

## 2021-07-13 ENCOUNTER — HOSPITAL ENCOUNTER (OUTPATIENT)
Dept: LAB | Facility: MEDICAL CENTER | Age: 56
End: 2021-07-13
Attending: FAMILY MEDICINE
Payer: COMMERCIAL

## 2021-07-13 VITALS
OXYGEN SATURATION: 95 % | BODY MASS INDEX: 39.25 KG/M2 | SYSTOLIC BLOOD PRESSURE: 118 MMHG | HEART RATE: 77 BPM | HEIGHT: 74 IN | TEMPERATURE: 98 F | DIASTOLIC BLOOD PRESSURE: 74 MMHG | RESPIRATION RATE: 20 BRPM | WEIGHT: 305.8 LBS

## 2021-07-13 DIAGNOSIS — R07.9 CHEST PAIN, UNSPECIFIED TYPE: ICD-10-CM

## 2021-07-13 DIAGNOSIS — Z00.00 HEALTHCARE MAINTENANCE: ICD-10-CM

## 2021-07-13 DIAGNOSIS — L84 CORN: ICD-10-CM

## 2021-07-13 DIAGNOSIS — Z23 NEED FOR VACCINATION: ICD-10-CM

## 2021-07-13 DIAGNOSIS — R73.03 PREDIABETES: ICD-10-CM

## 2021-07-13 DIAGNOSIS — E78.5 DYSLIPIDEMIA: ICD-10-CM

## 2021-07-13 LAB
ALBUMIN SERPL BCP-MCNC: 4 G/DL (ref 3.2–4.9)
ALBUMIN/GLOB SERPL: 1.2 G/DL
ALP SERPL-CCNC: 59 U/L (ref 30–99)
ALT SERPL-CCNC: 16 U/L (ref 2–50)
ANION GAP SERPL CALC-SCNC: 12 MMOL/L (ref 7–16)
AST SERPL-CCNC: 18 U/L (ref 12–45)
BILIRUB SERPL-MCNC: 0.7 MG/DL (ref 0.1–1.5)
BUN SERPL-MCNC: 17 MG/DL (ref 8–22)
CALCIUM SERPL-MCNC: 8.9 MG/DL (ref 8.5–10.5)
CHLORIDE SERPL-SCNC: 105 MMOL/L (ref 96–112)
CHOLEST SERPL-MCNC: 169 MG/DL (ref 100–199)
CO2 SERPL-SCNC: 22 MMOL/L (ref 20–33)
CREAT SERPL-MCNC: 0.84 MG/DL (ref 0.5–1.4)
D DIMER PPP IA.FEU-MCNC: <0.27 UG/ML (FEU) (ref 0–0.5)
EST. AVERAGE GLUCOSE BLD GHB EST-MCNC: 117 MG/DL
FASTING STATUS PATIENT QL REPORTED: NORMAL
GLOBULIN SER CALC-MCNC: 3.3 G/DL (ref 1.9–3.5)
GLUCOSE SERPL-MCNC: 77 MG/DL (ref 65–99)
HBA1C MFR BLD: 5.7 % (ref 4–5.6)
HDLC SERPL-MCNC: 48 MG/DL
LDLC SERPL CALC-MCNC: 106 MG/DL
LIPASE SERPL-CCNC: 25 U/L (ref 11–82)
POTASSIUM SERPL-SCNC: 3.7 MMOL/L (ref 3.6–5.5)
PROT SERPL-MCNC: 7.3 G/DL (ref 6–8.2)
PSA SERPL-MCNC: 4.09 NG/ML (ref 0–4)
SODIUM SERPL-SCNC: 139 MMOL/L (ref 135–145)
TRIGL SERPL-MCNC: 75 MG/DL (ref 0–149)

## 2021-07-13 PROCEDURE — 85379 FIBRIN DEGRADATION QUANT: CPT

## 2021-07-13 PROCEDURE — 80061 LIPID PANEL: CPT

## 2021-07-13 PROCEDURE — 90471 IMMUNIZATION ADMIN: CPT | Performed by: FAMILY MEDICINE

## 2021-07-13 PROCEDURE — 36415 COLL VENOUS BLD VENIPUNCTURE: CPT

## 2021-07-13 PROCEDURE — 80053 COMPREHEN METABOLIC PANEL: CPT

## 2021-07-13 PROCEDURE — 99214 OFFICE O/P EST MOD 30 MIN: CPT | Mod: 25 | Performed by: FAMILY MEDICINE

## 2021-07-13 PROCEDURE — 83036 HEMOGLOBIN GLYCOSYLATED A1C: CPT

## 2021-07-13 PROCEDURE — 83690 ASSAY OF LIPASE: CPT

## 2021-07-13 PROCEDURE — 90750 HZV VACC RECOMBINANT IM: CPT | Performed by: FAMILY MEDICINE

## 2021-07-13 PROCEDURE — 84153 ASSAY OF PSA TOTAL: CPT

## 2021-07-13 RX ORDER — OMEPRAZOLE 20 MG/1
20 CAPSULE, DELAYED RELEASE ORAL DAILY
Qty: 14 CAPSULE | Refills: 0 | Status: SHIPPED | OUTPATIENT
Start: 2021-07-13 | End: 2021-07-27

## 2021-07-13 SDOH — HEALTH STABILITY: MENTAL HEALTH
STRESS IS WHEN SOMEONE FEELS TENSE, NERVOUS, ANXIOUS, OR CAN'T SLEEP AT NIGHT BECAUSE THEIR MIND IS TROUBLED. HOW STRESSED ARE YOU?: RATHER MUCH

## 2021-07-13 SDOH — ECONOMIC STABILITY: INCOME INSECURITY: HOW HARD IS IT FOR YOU TO PAY FOR THE VERY BASICS LIKE FOOD, HOUSING, MEDICAL CARE, AND HEATING?: NOT HARD AT ALL

## 2021-07-13 SDOH — ECONOMIC STABILITY: TRANSPORTATION INSECURITY
IN THE PAST 12 MONTHS, HAS LACK OF RELIABLE TRANSPORTATION KEPT YOU FROM MEDICAL APPOINTMENTS, MEETINGS, WORK OR FROM GETTING THINGS NEEDED FOR DAILY LIVING?: NO

## 2021-07-13 SDOH — ECONOMIC STABILITY: FOOD INSECURITY: WITHIN THE PAST 12 MONTHS, THE FOOD YOU BOUGHT JUST DIDN'T LAST AND YOU DIDN'T HAVE MONEY TO GET MORE.: NEVER TRUE

## 2021-07-13 SDOH — ECONOMIC STABILITY: HOUSING INSECURITY
IN THE LAST 12 MONTHS, WAS THERE A TIME WHEN YOU DID NOT HAVE A STEADY PLACE TO SLEEP OR SLEPT IN A SHELTER (INCLUDING NOW)?: NO

## 2021-07-13 SDOH — HEALTH STABILITY: PHYSICAL HEALTH: ON AVERAGE, HOW MANY MINUTES DO YOU ENGAGE IN EXERCISE AT THIS LEVEL?: 90 MIN

## 2021-07-13 SDOH — ECONOMIC STABILITY: FOOD INSECURITY: WITHIN THE PAST 12 MONTHS, YOU WORRIED THAT YOUR FOOD WOULD RUN OUT BEFORE YOU GOT MONEY TO BUY MORE.: NEVER TRUE

## 2021-07-13 SDOH — ECONOMIC STABILITY: INCOME INSECURITY: IN THE LAST 12 MONTHS, WAS THERE A TIME WHEN YOU WERE NOT ABLE TO PAY THE MORTGAGE OR RENT ON TIME?: NO

## 2021-07-13 SDOH — HEALTH STABILITY: PHYSICAL HEALTH: ON AVERAGE, HOW MANY DAYS PER WEEK DO YOU ENGAGE IN MODERATE TO STRENUOUS EXERCISE (LIKE A BRISK WALK)?: 3 DAYS

## 2021-07-13 SDOH — ECONOMIC STABILITY: HOUSING INSECURITY: IN THE LAST 12 MONTHS, HOW MANY PLACES HAVE YOU LIVED?: 1

## 2021-07-13 SDOH — ECONOMIC STABILITY: TRANSPORTATION INSECURITY
IN THE PAST 12 MONTHS, HAS THE LACK OF TRANSPORTATION KEPT YOU FROM MEDICAL APPOINTMENTS OR FROM GETTING MEDICATIONS?: NO

## 2021-07-13 SDOH — ECONOMIC STABILITY: TRANSPORTATION INSECURITY
IN THE PAST 12 MONTHS, HAS LACK OF TRANSPORTATION KEPT YOU FROM MEETINGS, WORK, OR FROM GETTING THINGS NEEDED FOR DAILY LIVING?: NO

## 2021-07-13 ASSESSMENT — SOCIAL DETERMINANTS OF HEALTH (SDOH)
WITHIN THE PAST 12 MONTHS, YOU WORRIED THAT YOUR FOOD WOULD RUN OUT BEFORE YOU GOT THE MONEY TO BUY MORE: NEVER TRUE
HOW OFTEN DO YOU ATTENT MEETINGS OF THE CLUB OR ORGANIZATION YOU BELONG TO?: MORE THAN 4 TIMES PER YEAR
HOW OFTEN DO YOU ATTENT MEETINGS OF THE CLUB OR ORGANIZATION YOU BELONG TO?: MORE THAN 4 TIMES PER YEAR
HOW OFTEN DO YOU GET TOGETHER WITH FRIENDS OR RELATIVES?: ONCE A WEEK
HOW OFTEN DO YOU ATTEND CHURCH OR RELIGIOUS SERVICES?: NEVER
HOW OFTEN DO YOU ATTEND CHURCH OR RELIGIOUS SERVICES?: NEVER
HOW OFTEN DO YOU HAVE A DRINK CONTAINING ALCOHOL: MONTHLY OR LESS
HOW HARD IS IT FOR YOU TO PAY FOR THE VERY BASICS LIKE FOOD, HOUSING, MEDICAL CARE, AND HEATING?: NOT HARD AT ALL
HOW MANY DRINKS CONTAINING ALCOHOL DO YOU HAVE ON A TYPICAL DAY WHEN YOU ARE DRINKING: 1 OR 2
DO YOU BELONG TO ANY CLUBS OR ORGANIZATIONS SUCH AS CHURCH GROUPS UNIONS, FRATERNAL OR ATHLETIC GROUPS, OR SCHOOL GROUPS?: YES
DO YOU BELONG TO ANY CLUBS OR ORGANIZATIONS SUCH AS CHURCH GROUPS UNIONS, FRATERNAL OR ATHLETIC GROUPS, OR SCHOOL GROUPS?: YES
IN A TYPICAL WEEK, HOW MANY TIMES DO YOU TALK ON THE PHONE WITH FAMILY, FRIENDS, OR NEIGHBORS?: ONCE A WEEK
HOW OFTEN DO YOU HAVE SIX OR MORE DRINKS ON ONE OCCASION: NEVER
HOW OFTEN DO YOU GET TOGETHER WITH FRIENDS OR RELATIVES?: ONCE A WEEK
IN A TYPICAL WEEK, HOW MANY TIMES DO YOU TALK ON THE PHONE WITH FAMILY, FRIENDS, OR NEIGHBORS?: ONCE A WEEK

## 2021-07-13 ASSESSMENT — LIFESTYLE VARIABLES
HOW OFTEN DO YOU HAVE A DRINK CONTAINING ALCOHOL: MONTHLY OR LESS
HOW MANY STANDARD DRINKS CONTAINING ALCOHOL DO YOU HAVE ON A TYPICAL DAY: 1 OR 2
HOW OFTEN DO YOU HAVE SIX OR MORE DRINKS ON ONE OCCASION: NEVER

## 2021-07-13 ASSESSMENT — PATIENT HEALTH QUESTIONNAIRE - PHQ9: CLINICAL INTERPRETATION OF PHQ2 SCORE: 0

## 2021-07-13 NOTE — ASSESSMENT & PLAN NOTE
Lipids: Ordered.  Fasting Glucose: Ordered.  Hepatitis C Screen: done 2018 and normal.   Colonoscopy: due 2021.     Tdap: given 2019.   Shingrix: Given 2021.  Covid vaccine: Given 2021.

## 2021-07-13 NOTE — ASSESSMENT & PLAN NOTE
Patient presents with a new problem today.  For the past few months he has been experiencing chest pain which he describes as dull.  It is currently located in the right lower chest that sometimes radiates to the left lower chest.  During the visit he described it as both intermittent and constant.  This occurred after he was engaged in some heavy activity gardening several months ago.  It tends to be exacerbated by sneezing.  There is no associated shortness of breath, dizziness, sweating or palpitations.

## 2021-07-13 NOTE — ASSESSMENT & PLAN NOTE
Patient describes 6 months of an uncomfortable left lateral foot lesion without associated trauma.

## 2021-07-13 NOTE — PROGRESS NOTES
"[facility]  Progress Note  Established Patient    Subjective:   Tommy Guillen is a 55 y.o. male here today with a chief complaint of chest pain. The patient is alone.     Chest pain  Patient presents with a new problem today.  For the past few months he has been experiencing chest pain which he describes as dull.  It is currently located in the right lower chest that sometimes radiates to the left lower chest.  During the visit he described it as both intermittent and constant.  This occurred after he was engaged in some heavy activity gardening several months ago.  It tends to be exacerbated by sneezing.  There is no associated shortness of breath, dizziness, sweating or palpitations.    Dyslipidemia  By history.    Prediabetes  By history.    Healthcare maintenance  Lipids: Ordered.  Fasting Glucose: Ordered.  Hepatitis C Screen: done 2018 and normal.   Colonoscopy: due 2021.     Tdap: given 2019.   Shingrix: Given 2021.  Covid vaccine: Given 2021.    [place]  Patient describes 6 months of an uncomfortable left lateral foot lesion without associated trauma.      No current outpatient medications on file prior to visit.     No current facility-administered medications on file prior to visit.          Objective:     Vitals:    07/13/21 1324   BP: 118/74   BP Location: Left arm   Patient Position: Sitting   BP Cuff Size: Large adult   Pulse: 77   Resp: 20   Temp: 36.7 °C (98 °F)   TempSrc: Temporal   SpO2: 95%   Weight: (!) 139 kg (305 lb 12.8 oz)   Height: 1.873 m (6' 1.75\")       Physical Exam:  General: alert in no apparent distress.   Chest: RRR. No reproducible TTP. No breast masses. CTAB.   Abd: soft, negative Ruvalcaba sign, slight epigastric tenderness.   Skin: Left lateral foot corn.        Assessment and Plan:     1. Need for vaccination  - Shingles Vaccine (Shingrix)    2. Prediabetes  - Comp Metabolic Panel; Future  - HEMOGLOBIN A1C; Future    3. Dyslipidemia  - Comp Metabolic Panel; Future  - " Lipid Profile; Future    4. Healthcare maintenance  - see HPI.   - Comp Metabolic Panel; Future  - Lipid Profile; Future  - PROSTATE SPECIFIC AG SCREENING; Future    5. Chest pain, unspecified type  EKG reassuring.  We will proceed with the following work-up.  I suspect this is costochondritis but we will presumptively treat for GERD as we await the following work-up.  - EKG - Clinic Performed  - D-DIMER; Future  - DX-CHEST-2 VIEWS; Future  - NM-CARDIAC STRESS TEST; Future  - omeprazole (PRILOSEC) 20 MG delayed-release capsule; Take 1 capsule by mouth every day for 14 days.  Dispense: 14 capsule; Refill: 0  - LIPASE; Future    6. Corn  This is a corn.  Recommended offloading along with corn pads and salicylic acid.        Followup: Return in about 2 weeks (around 7/27/2021), or if symptoms worsen or fail to improve.

## 2021-07-14 DIAGNOSIS — R97.20 ELEVATED PSA: ICD-10-CM

## 2021-07-21 ENCOUNTER — TELEPHONE (OUTPATIENT)
Dept: MEDICAL GROUP | Facility: MEDICAL CENTER | Age: 56
End: 2021-07-21

## 2021-07-21 NOTE — TELEPHONE ENCOUNTER
Phone Number Called: [phone] (home)     Call outcome: LM informing pt that Dr. Mccann sent a mess via my chart. Pt. to respond back to Dr. Mccann and or give us a call.

## 2021-07-21 NOTE — TELEPHONE ENCOUNTER
Patient didn't read [employer] message. Please relay the message below to this patient.       Your PSA is a little up. I recommend we repeat this in 2 weeks. I have placed the order.

## 2021-07-22 ENCOUNTER — HOSPITAL ENCOUNTER (OUTPATIENT)
Dept: RADIOLOGY | Facility: MEDICAL CENTER | Age: 56
End: 2021-07-22
Attending: FAMILY MEDICINE
Payer: COMMERCIAL

## 2021-07-22 DIAGNOSIS — R07.9 CHEST PAIN, UNSPECIFIED TYPE: ICD-10-CM

## 2021-07-22 PROCEDURE — A9502 TC99M TETROFOSMIN: HCPCS

## 2021-07-22 PROCEDURE — 93018 CV STRESS TEST I&R ONLY: CPT | Performed by: INTERNAL MEDICINE

## 2021-07-22 PROCEDURE — 71046 X-RAY EXAM CHEST 2 VIEWS: CPT

## 2021-07-22 PROCEDURE — 78452 HT MUSCLE IMAGE SPECT MULT: CPT | Mod: 26 | Performed by: INTERNAL MEDICINE

## 2021-07-29 ENCOUNTER — OFFICE VISIT (OUTPATIENT)
Dept: MEDICAL GROUP | Facility: MEDICAL CENTER | Age: 56
End: 2021-07-29
Payer: COMMERCIAL

## 2021-07-29 VITALS
RESPIRATION RATE: 20 BRPM | BODY MASS INDEX: 38.99 KG/M2 | SYSTOLIC BLOOD PRESSURE: 104 MMHG | HEART RATE: 71 BPM | TEMPERATURE: 98.6 F | WEIGHT: 303.8 LBS | HEIGHT: 74 IN | OXYGEN SATURATION: 94 % | DIASTOLIC BLOOD PRESSURE: 68 MMHG

## 2021-07-29 DIAGNOSIS — R07.9 CHEST PAIN, UNSPECIFIED TYPE: ICD-10-CM

## 2021-07-29 DIAGNOSIS — R97.20 ELEVATED PSA: ICD-10-CM

## 2021-07-29 DIAGNOSIS — E78.5 DYSLIPIDEMIA: ICD-10-CM

## 2021-07-29 PROCEDURE — 99214 OFFICE O/P EST MOD 30 MIN: CPT | Performed by: FAMILY MEDICINE

## 2021-07-29 RX ORDER — NAPROXEN 375 MG/1
375 TABLET ORAL 2 TIMES DAILY WITH MEALS
Qty: 14 TABLET | Refills: 0 | Status: SHIPPED | OUTPATIENT
Start: 2021-07-29 | End: 2021-08-05

## 2021-07-29 RX ORDER — MULTIVIT WITH MINERALS/LUTEIN
1 TABLET ORAL DAILY
COMMUNITY
End: 2022-10-03

## 2021-07-29 RX ORDER — MULTIVIT WITH MINERALS/LUTEIN
1000 TABLET ORAL DAILY
COMMUNITY
End: 2022-10-03

## 2021-07-29 NOTE — PROGRESS NOTES
"[facility]  Progress Note  Established Patient    Subjective:   Tommy Guillen is a 55 y.o. male here today with a chief complaint of chest pain. The patient is alone.     Chest pain  7/13/2021: Patient presents with a new problem today.  For the past few months he has been experiencing chest pain which he describes as dull.  It is currently located in the right lower chest that sometimes radiates to the left lower chest.  During the visit he described it as both intermittent and constant.  This occurred after he was engaged in some heavy activity gardening several months ago.  It tends to be exacerbated by sneezing.  There is no associated shortness of breath, dizziness, sweating or palpitations.    Interval history: D dimer, nuclear stress test and CXR normal.     07/29/21: CP persists, nonresponsive to omeprazole.     Elevated PSA  Noted on labs.     Dyslipidemia  The 10-year ASCVD risk score ([place] ELIANA Jr., et al., 2013) is: 3.3%        Current Outpatient Medications on File Prior to Visit   Medication Sig Dispense Refill   • Ascorbic Acid (VITAMIN C) 1000 MG Tab Take 1 tablet by mouth every day.     • vitamin E (VITAMIN E) 1000 Unit (450 mg) Cap Take 1,000 Units by mouth every day.       No current facility-administered medications on file prior to visit.          Objective:     Vitals:    07/29/21 0732   BP: 104/68   BP Location: Left arm   Patient Position: Sitting   BP Cuff Size: Large adult   Pulse: 71   Resp: 20   Temp: 37 °C (98.6 °F)   TempSrc: Temporal   SpO2: 94%   Weight: (!) 138 kg (303 lb 12.8 oz)   Height: 1.88 m (6' 2\")       Physical Exam:  General: alert in no apparent distress.         Assessment and Plan:     1. Elevated PSA  Repeat PSA in 6 weeks.  - PROSTATE SPECIFIC AG DIAGNOSTIC; Future    2. Chest pain, unspecified type  Suspect costocondritis, will do trial of naproxen.   - naproxen (NAPROSYN) 375 MG Tab; Take 1 tablet by mouth 2 times a day with meals for 7 days.  Dispense: 14 " tablet; Refill: 0    3. Dyslipidemia  - diet and exercise.         Followup: Return in about 1 year (around 7/29/2022), or if symptoms worsen or fail to improve, for Wellness Visit, Long.

## 2021-07-29 NOTE — ASSESSMENT & PLAN NOTE
7/13/2021: Patient presents with a new problem today.  For the past few months he has been experiencing chest pain which he describes as dull.  It is currently located in the right lower chest that sometimes radiates to the left lower chest.  During the visit he described it as both intermittent and constant.  This occurred after he was engaged in some heavy activity gardening several months ago.  It tends to be exacerbated by sneezing.  There is no associated shortness of breath, dizziness, sweating or palpitations.    Interval history: D dimer, nuclear stress test and CXR normal.     07/29/21: CP persists, nonresponsive to omeprazole.

## 2021-08-13 ENCOUNTER — OFFICE VISIT (OUTPATIENT)
Dept: URGENT CARE | Facility: CLINIC | Age: 56
End: 2021-08-13
Payer: COMMERCIAL

## 2021-08-13 ENCOUNTER — HOSPITAL ENCOUNTER (OUTPATIENT)
Facility: MEDICAL CENTER | Age: 56
End: 2021-08-13
Attending: PHYSICIAN ASSISTANT
Payer: COMMERCIAL

## 2021-08-13 VITALS
OXYGEN SATURATION: 94 % | HEART RATE: 82 BPM | HEIGHT: 74 IN | DIASTOLIC BLOOD PRESSURE: 70 MMHG | SYSTOLIC BLOOD PRESSURE: 128 MMHG | TEMPERATURE: 97.6 F | WEIGHT: 302 LBS | RESPIRATION RATE: 17 BRPM | BODY MASS INDEX: 38.76 KG/M2

## 2021-08-13 DIAGNOSIS — R09.81 NASAL CONGESTION: ICD-10-CM

## 2021-08-13 DIAGNOSIS — Z20.822 CLOSE EXPOSURE TO COVID-19 VIRUS: ICD-10-CM

## 2021-08-13 PROCEDURE — 99213 OFFICE O/P EST LOW 20 MIN: CPT | Mod: CS | Performed by: PHYSICIAN ASSISTANT

## 2021-08-13 PROCEDURE — U0005 INFEC AGEN DETEC AMPLI PROBE: HCPCS

## 2021-08-13 PROCEDURE — U0003 INFECTIOUS AGENT DETECTION BY NUCLEIC ACID (DNA OR RNA); SEVERE ACUTE RESPIRATORY SYNDROME CORONAVIRUS 2 (SARS-COV-2) (CORONAVIRUS DISEASE [COVID-19]), AMPLIFIED PROBE TECHNIQUE, MAKING USE OF HIGH THROUGHPUT TECHNOLOGIES AS DESCRIBED BY CMS-2020-01-R: HCPCS

## 2021-08-13 ASSESSMENT — ENCOUNTER SYMPTOMS
CHILLS: 0
FEVER: 0

## 2021-08-13 NOTE — PROGRESS NOTES
"  Subjective:   Tommy Guillen is a 55 y.o. male who presents today with   Chief Complaint   Patient presents with   • Runny Nose     runny nose x 3 days        Other  This is a new problem. Episode onset: 3 days. The problem occurs constantly. The problem has been unchanged. Associated symptoms include congestion. Pertinent negatives include no chills or fever. Associated symptoms comments: Runny nose. Nothing aggravates the symptoms. Treatments tried: Decongestant. The treatment provided mild relief.   I personally donned proper PPE throughout visit today.   Did have his Covid vaccines.  He did have known positive exposure earlier this week with a coworker.    PMH:  has a past medical history of Asymptomatic cholelithiasis (2012), Colon polyps (2012), Family history of colon cancer, and Morbid obesity (HCC) (7/2/2013).  MEDS:   Current Outpatient Medications:   •  Ascorbic Acid (VITAMIN C) 1000 MG Tab, Take 1 tablet by mouth every day., Disp: , Rfl:   •  vitamin E (VITAMIN E) 1000 Unit (450 mg) Cap, Take 1,000 Units by mouth every day., Disp: , Rfl:   ALLERGIES: No Known Allergies  SURGHX:   Past Surgical History:   Procedure Laterality Date   • COLONOSCOPY  2011   • KAIDEN RECTAL ABSCESS  1990s     SOCHX:  reports that he has never smoked. He has never used smokeless tobacco. He reports previous alcohol use of about 0.6 oz of alcohol per week. He reports that he does not use drugs.  FH: Reviewed with patient, not pertinent to this visit.       Review of Systems   Constitutional: Negative for chills, fever and malaise/fatigue.   HENT: Positive for congestion.         Runny nose        Objective:   /70 (BP Location: Left arm, Patient Position: Sitting, BP Cuff Size: Adult)   Pulse 82   Temp 36.4 °C (97.6 °F) (Temporal)   Resp 17   Ht 1.88 m (6' 2\")   Wt (!) 137 kg (302 lb)   SpO2 94%   BMI 38.77 kg/m²   Physical Exam  Vitals and nursing note reviewed.   Constitutional:       General: He is not in acute " distress.     Appearance: Normal appearance. He is well-developed. He is not ill-appearing or toxic-appearing.   HENT:      Head: Normocephalic and atraumatic.      Right Ear: Hearing normal.      Left Ear: Hearing normal.      Nose: Rhinorrhea present.   Eyes:      Conjunctiva/sclera: Conjunctivae normal.   Cardiovascular:      Rate and Rhythm: Normal rate and regular rhythm.      Heart sounds: Normal heart sounds.   Pulmonary:      Effort: Pulmonary effort is normal.      Breath sounds: No stridor. No wheezing, rhonchi or rales.   Musculoskeletal:      Comments: Normal movement in all 4 extremities   Skin:     General: Skin is warm and dry.   Neurological:      Mental Status: He is alert.      Coordination: Coordination normal.   Psychiatric:         Mood and Affect: Mood normal.           Assessment/Plan:   Assessment    1. Nasal congestion  - SARS-CoV-2 PCR (24 hour In-House): Collect NP swab in VTM; Future    2. Close exposure to COVID-19 virus  - SARS-CoV-2 PCR (24 hour In-House): Collect NP swab in VTM; Future  Most consistent with viral illness at this time we will rule out Covid.  Discussed CDC guidelines including self isolation at home.   Patient encouraged to get plenty of rest, use OTC tylenol for pain/fever, and drink plenty of fluids.    Differential diagnosis, natural history, supportive care, and indications for immediate follow-up discussed.   Patient given instructions and understanding of medications and treatment.    If not improving in 3-5 days, F/U with PCP or return to  if symptoms worsen.  Greater than 20 minutes were spent reviewing patient's chart, examining and obtaining history from patient, and discussing plan of care.     Patient agreeable to plan.      Please note that this dictation was created using voice recognition software. I have made every reasonable attempt to correct obvious errors, but I expect that there are errors of grammar and possibly content that I did not discover  before finalizing the note.    Vipul Pelayo PA-C

## 2021-08-14 DIAGNOSIS — Z20.822 CLOSE EXPOSURE TO COVID-19 VIRUS: ICD-10-CM

## 2021-08-14 DIAGNOSIS — R09.81 NASAL CONGESTION: ICD-10-CM

## 2021-08-14 LAB
COVID ORDER STATUS COVID19: NORMAL
SARS-COV-2 RNA RESP QL NAA+PROBE: DETECTED
SPECIMEN SOURCE: ABNORMAL

## 2022-10-03 ENCOUNTER — OFFICE VISIT (OUTPATIENT)
Dept: MEDICAL GROUP | Facility: IMAGING CENTER | Age: 57
End: 2022-10-03
Payer: COMMERCIAL

## 2022-10-03 VITALS
TEMPERATURE: 98.7 F | SYSTOLIC BLOOD PRESSURE: 120 MMHG | HEART RATE: 66 BPM | DIASTOLIC BLOOD PRESSURE: 80 MMHG | WEIGHT: 302.6 LBS | BODY MASS INDEX: 38.84 KG/M2 | HEIGHT: 74 IN | OXYGEN SATURATION: 95 % | RESPIRATION RATE: 14 BRPM

## 2022-10-03 DIAGNOSIS — R97.20 ELEVATED PSA: ICD-10-CM

## 2022-10-03 DIAGNOSIS — Z23 NEED FOR VACCINATION: ICD-10-CM

## 2022-10-03 DIAGNOSIS — Z80.0 FAMILY HISTORY OF COLON CANCER: ICD-10-CM

## 2022-10-03 DIAGNOSIS — R73.03 PREDIABETES: ICD-10-CM

## 2022-10-03 DIAGNOSIS — E78.5 DYSLIPIDEMIA: ICD-10-CM

## 2022-10-03 DIAGNOSIS — E66.01 MORBID OBESITY DUE TO EXCESS CALORIES (HCC): ICD-10-CM

## 2022-10-03 DIAGNOSIS — K76.0 FATTY LIVER: ICD-10-CM

## 2022-10-03 DIAGNOSIS — E55.9 VITAMIN D DEFICIENCY: ICD-10-CM

## 2022-10-03 DIAGNOSIS — L85.3 DRY SKIN: ICD-10-CM

## 2022-10-03 DIAGNOSIS — Z76.89 ENCOUNTER TO ESTABLISH CARE WITH NEW DOCTOR: ICD-10-CM

## 2022-10-03 PROBLEM — Z00.00 HEALTHCARE MAINTENANCE: Status: RESOLVED | Noted: 2017-09-06 | Resolved: 2022-10-03

## 2022-10-03 PROBLEM — L84 CORN: Status: RESOLVED | Noted: 2021-07-13 | Resolved: 2022-10-03

## 2022-10-03 PROBLEM — R07.9 CHEST PAIN: Status: RESOLVED | Noted: 2021-07-13 | Resolved: 2022-10-03

## 2022-10-03 PROCEDURE — 90686 IIV4 VACC NO PRSV 0.5 ML IM: CPT | Performed by: CLINICAL NURSE SPECIALIST

## 2022-10-03 PROCEDURE — 99214 OFFICE O/P EST MOD 30 MIN: CPT | Mod: 25 | Performed by: CLINICAL NURSE SPECIALIST

## 2022-10-03 PROCEDURE — 90471 IMMUNIZATION ADMIN: CPT | Performed by: CLINICAL NURSE SPECIALIST

## 2022-10-03 SDOH — ECONOMIC STABILITY: HOUSING INSECURITY: IN THE LAST 12 MONTHS, HOW MANY PLACES HAVE YOU LIVED?: 1

## 2022-10-03 SDOH — HEALTH STABILITY: MENTAL HEALTH
STRESS IS WHEN SOMEONE FEELS TENSE, NERVOUS, ANXIOUS, OR CAN'T SLEEP AT NIGHT BECAUSE THEIR MIND IS TROUBLED. HOW STRESSED ARE YOU?: NOT AT ALL

## 2022-10-03 SDOH — HEALTH STABILITY: PHYSICAL HEALTH: ON AVERAGE, HOW MANY MINUTES DO YOU ENGAGE IN EXERCISE AT THIS LEVEL?: 60 MIN

## 2022-10-03 SDOH — ECONOMIC STABILITY: INCOME INSECURITY: HOW HARD IS IT FOR YOU TO PAY FOR THE VERY BASICS LIKE FOOD, HOUSING, MEDICAL CARE, AND HEATING?: NOT HARD AT ALL

## 2022-10-03 SDOH — ECONOMIC STABILITY: FOOD INSECURITY: WITHIN THE PAST 12 MONTHS, YOU WORRIED THAT YOUR FOOD WOULD RUN OUT BEFORE YOU GOT MONEY TO BUY MORE.: NEVER TRUE

## 2022-10-03 SDOH — ECONOMIC STABILITY: INCOME INSECURITY: IN THE LAST 12 MONTHS, WAS THERE A TIME WHEN YOU WERE NOT ABLE TO PAY THE MORTGAGE OR RENT ON TIME?: NO

## 2022-10-03 SDOH — ECONOMIC STABILITY: FOOD INSECURITY: WITHIN THE PAST 12 MONTHS, THE FOOD YOU BOUGHT JUST DIDN'T LAST AND YOU DIDN'T HAVE MONEY TO GET MORE.: NEVER TRUE

## 2022-10-03 SDOH — HEALTH STABILITY: PHYSICAL HEALTH

## 2022-10-03 ASSESSMENT — SOCIAL DETERMINANTS OF HEALTH (SDOH)
DO YOU BELONG TO ANY CLUBS OR ORGANIZATIONS SUCH AS CHURCH GROUPS UNIONS, FRATERNAL OR ATHLETIC GROUPS, OR SCHOOL GROUPS?: YES
HOW OFTEN DO YOU ATTENT MEETINGS OF THE CLUB OR ORGANIZATION YOU BELONG TO?: MORE THAN 4 TIMES PER YEAR
WITHIN THE PAST 12 MONTHS, YOU WORRIED THAT YOUR FOOD WOULD RUN OUT BEFORE YOU GOT THE MONEY TO BUY MORE: NEVER TRUE
HOW OFTEN DO YOU GET TOGETHER WITH FRIENDS OR RELATIVES?: ONCE A WEEK
IN A TYPICAL WEEK, HOW MANY TIMES DO YOU TALK ON THE PHONE WITH FAMILY, FRIENDS, OR NEIGHBORS?: ONCE A WEEK
HOW OFTEN DO YOU ATTEND CHURCH OR RELIGIOUS SERVICES?: NEVER
DO YOU BELONG TO ANY CLUBS OR ORGANIZATIONS SUCH AS CHURCH GROUPS UNIONS, FRATERNAL OR ATHLETIC GROUPS, OR SCHOOL GROUPS?: YES
HOW OFTEN DO YOU ATTENT MEETINGS OF THE CLUB OR ORGANIZATION YOU BELONG TO?: MORE THAN 4 TIMES PER YEAR
HOW OFTEN DO YOU GET TOGETHER WITH FRIENDS OR RELATIVES?: ONCE A WEEK
HOW OFTEN DO YOU HAVE A DRINK CONTAINING ALCOHOL: MONTHLY OR LESS
IN A TYPICAL WEEK, HOW MANY TIMES DO YOU TALK ON THE PHONE WITH FAMILY, FRIENDS, OR NEIGHBORS?: ONCE A WEEK
HOW HARD IS IT FOR YOU TO PAY FOR THE VERY BASICS LIKE FOOD, HOUSING, MEDICAL CARE, AND HEATING?: NOT HARD AT ALL
HOW OFTEN DO YOU ATTEND CHURCH OR RELIGIOUS SERVICES?: NEVER

## 2022-10-03 ASSESSMENT — PAIN SCALES - GENERAL: PAINLEVEL: NO PAIN

## 2022-10-03 ASSESSMENT — PATIENT HEALTH QUESTIONNAIRE - PHQ9: CLINICAL INTERPRETATION OF PHQ2 SCORE: 0

## 2022-10-03 ASSESSMENT — LIFESTYLE VARIABLES: HOW OFTEN DO YOU HAVE A DRINK CONTAINING ALCOHOL: MONTHLY OR LESS

## 2022-10-03 NOTE — ASSESSMENT & PLAN NOTE
Yard work for exercise.  Eats fruits and veggies daily. Eats fried food.  Desserts a couple times a week.  Bread frequently.  Eats meat.  Trying to reduce red meat.  Alcohol rarely.  No cigarettes.

## 2022-10-03 NOTE — ASSESSMENT & PLAN NOTE
The 10-year ASCVD risk score ([place] MONRO[place] Jr., et al., 2013) is: 5.2%  Not taking medication

## 2022-10-04 NOTE — PROGRESS NOTES
"Subjective     Tommy Guillen is a 57 y.o. male who presents with Establish Care            HPI  Family history of colon cancer  Last colonoscopy 1/24/2022    Vitamin D deficiency  Taking a multivitamin but not a separate vitamin D.    Elevated PSA  PSA ordered by previous provider slightly elevated.     Morbid obesity due to excess calories (CMS-[facility])  Yard work for exercise.  Eats fruits and veggies daily. Eats fried food.  Desserts a couple times a week.  Bread frequently.  Eats meat.  Trying to reduce red meat.  Alcohol rarely.  No cigarettes.     Dyslipidemia  The 10-year ASCVD risk score ([place] ELIANA Jr., et al., 2013) is: 5.2%  Not taking medication      Fatty liver  2017 found on us abdomen.  Not interested in rechecking.  No black, tarry stool. BM's regular.      Dry skin  Hands worse in winter.      ROS  See HPI     No Known Allergies    Current Outpatient Medications on File Prior to Visit   Medication Sig Dispense Refill    multivitamin (THERAGRAN) Tab Take 1 Tablet by mouth every day.       No current facility-administered medications on file prior to visit.         Objective     /80 (BP Location: Left arm, Patient Position: Sitting, BP Cuff Size: Large adult)   Pulse 66   Temp 37.1 °C (98.7 °F) (Temporal)   Resp 14   Ht 1.88 m (6' 2\")   Wt (!) 137 kg (302 lb 9.6 oz)   SpO2 95%   BMI 38.85 kg/m²      Physical Exam  Constitutional:       General: He is not in acute distress.     Appearance: He is not ill-appearing, toxic-appearing or diaphoretic.   HENT:      Head: Normocephalic and atraumatic.   Eyes:      General: No scleral icterus.     Pupils: Pupils are equal, round, and reactive to light.   Cardiovascular:      Rate and Rhythm: Normal rate and regular rhythm.      Heart sounds: Normal heart sounds.   Pulmonary:      Effort: Pulmonary effort is normal.      Breath sounds: Normal breath sounds.   Skin:     General: Skin is warm and dry.      Comments: Dry skin of bilateral hands with minor " excoriation on PIP   Neurological:      Mental Status: He is alert and oriented to person, place, and time.   Psychiatric:         Mood and Affect: Mood normal.         Behavior: Behavior normal.         Thought Content: Thought content normal.         Judgment: Judgment normal.                           Assessment & Plan      1. Encounter to establish care with new doctor  Previous provider Dr. Mccann    2. Need for vaccination    - INFLUENZA VACCINE QUAD INJ (PF)    3. Family history of colon cancer  Up-to-date on colonoscopy    4. Morbid obesity due to excess calories (HCC)  He declined referral to nutrition.  He reports that he knows what he is to do to make healthy lifestyle choices he just needs to do it.  Labs ordered..  - CBC WITH DIFFERENTIAL; Future  - Comp Metabolic Panel; Future  - HEMOGLOBIN A1C; Future  - TSH WITH REFLEX TO FT4; Future    5. Dyslipidemia  ASCVD risk at 5.2%.  Normal cardiac stress test 1 year ago with mild cardiomegaly on chest x-ray.  We will recheck labs.  - Lipid Profile; Future    6. Elevated PSA  PSA elevated from prior labs.  We will recheck  - PROSTATE SPECIFIC AG DIAGNOSTIC; Future    7. Vitamin D deficiency  Takes multivitamin.  We will check vitamin D  - VITAMIN D,25 HYDROXY (DEFICIENCY); Future    8. Prediabetes  Previous labs from prior provider show mildly elevated A1c  - Comp Metabolic Panel; Future  - HEMOGLOBIN A1C; Future    9. Fatty liver  Discussed findings from previous abdominal ultrasound.  He declined follow-up ultrasound and reports he would prefer to just work on lifestyle changes.    10. Dry skin  Apply cream or salve to skin at night and wear cotton gloves over this to bed.  Report back if this does not improve the dry skin    Return if symptoms worsen or fail to improve, for With test results.    The patient verbalized agreement and understanding of the current plan. All questions and concerns were addressed at the time of visit.    This note was dictated  using Dragon Software.  I have made every reasonable attempt to correct errors, but errors of grammar and content may still exist.

## 2023-03-31 ENCOUNTER — PATIENT MESSAGE (OUTPATIENT)
Dept: HEALTH INFORMATION MANAGEMENT | Facility: OTHER | Age: 58
End: 2023-03-31

## 2023-05-29 ENCOUNTER — HOSPITAL ENCOUNTER (OUTPATIENT)
Dept: LAB | Facility: MEDICAL CENTER | Age: 58
End: 2023-05-29
Attending: CLINICAL NURSE SPECIALIST
Payer: COMMERCIAL

## 2023-05-29 DIAGNOSIS — R73.03 PREDIABETES: ICD-10-CM

## 2023-05-29 DIAGNOSIS — R97.20 ELEVATED PSA: ICD-10-CM

## 2023-05-29 DIAGNOSIS — E66.01 MORBID OBESITY DUE TO EXCESS CALORIES (HCC): ICD-10-CM

## 2023-05-29 DIAGNOSIS — E78.5 DYSLIPIDEMIA: ICD-10-CM

## 2023-05-29 DIAGNOSIS — E55.9 VITAMIN D DEFICIENCY: ICD-10-CM

## 2023-05-29 LAB
ALBUMIN SERPL BCP-MCNC: 3.8 G/DL (ref 3.2–4.9)
ALBUMIN/GLOB SERPL: 1.2 G/DL
ALP SERPL-CCNC: 62 U/L (ref 30–99)
ALT SERPL-CCNC: 19 U/L (ref 2–50)
ANION GAP SERPL CALC-SCNC: 11 MMOL/L (ref 7–16)
AST SERPL-CCNC: 20 U/L (ref 12–45)
BASOPHILS # BLD AUTO: 0.5 % (ref 0–1.8)
BASOPHILS # BLD: 0.04 K/UL (ref 0–0.12)
BILIRUB SERPL-MCNC: 0.4 MG/DL (ref 0.1–1.5)
BUN SERPL-MCNC: 18 MG/DL (ref 8–22)
CALCIUM ALBUM COR SERPL-MCNC: 9 MG/DL (ref 8.5–10.5)
CALCIUM SERPL-MCNC: 8.8 MG/DL (ref 8.4–10.2)
CHLORIDE SERPL-SCNC: 105 MMOL/L (ref 96–112)
CHOLEST SERPL-MCNC: 178 MG/DL (ref 100–199)
CO2 SERPL-SCNC: 22 MMOL/L (ref 20–33)
CREAT SERPL-MCNC: 0.84 MG/DL (ref 0.5–1.4)
EOSINOPHIL # BLD AUTO: 0.34 K/UL (ref 0–0.51)
EOSINOPHIL NFR BLD: 4.6 % (ref 0–6.9)
ERYTHROCYTE [DISTWIDTH] IN BLOOD BY AUTOMATED COUNT: 41.5 FL (ref 35.9–50)
FASTING STATUS PATIENT QL REPORTED: NORMAL
GFR SERPLBLD CREATININE-BSD FMLA CKD-EPI: 101 ML/MIN/1.73 M 2
GLOBULIN SER CALC-MCNC: 3.2 G/DL (ref 1.9–3.5)
GLUCOSE SERPL-MCNC: 89 MG/DL (ref 65–99)
HCT VFR BLD AUTO: 48.5 % (ref 42–52)
HDLC SERPL-MCNC: 54 MG/DL
HGB BLD-MCNC: 16 G/DL (ref 14–18)
IMM GRANULOCYTES # BLD AUTO: 0.02 K/UL (ref 0–0.11)
IMM GRANULOCYTES NFR BLD AUTO: 0.3 % (ref 0–0.9)
LDLC SERPL CALC-MCNC: 108 MG/DL
LYMPHOCYTES # BLD AUTO: 1.67 K/UL (ref 1–4.8)
LYMPHOCYTES NFR BLD: 22.6 % (ref 22–41)
MCH RBC QN AUTO: 28.8 PG (ref 27–33)
MCHC RBC AUTO-ENTMCNC: 33 G/DL (ref 32.3–36.5)
MCV RBC AUTO: 87.4 FL (ref 81.4–97.8)
MONOCYTES # BLD AUTO: 0.53 K/UL (ref 0–0.85)
MONOCYTES NFR BLD AUTO: 7.2 % (ref 0–13.4)
NEUTROPHILS # BLD AUTO: 4.78 K/UL (ref 1.82–7.42)
NEUTROPHILS NFR BLD: 64.8 % (ref 44–72)
NRBC # BLD AUTO: 0 K/UL
NRBC BLD-RTO: 0 /100 WBC (ref 0–0.2)
PLATELET # BLD AUTO: 195 K/UL (ref 164–446)
PMV BLD AUTO: 9.7 FL (ref 9–12.9)
POTASSIUM SERPL-SCNC: 3.7 MMOL/L (ref 3.6–5.5)
PROT SERPL-MCNC: 7 G/DL (ref 6–8.2)
RBC # BLD AUTO: 5.55 M/UL (ref 4.7–6.1)
SODIUM SERPL-SCNC: 138 MMOL/L (ref 135–145)
TRIGL SERPL-MCNC: 79 MG/DL (ref 0–149)
TSH SERPL DL<=0.005 MIU/L-ACNC: 1.29 UIU/ML (ref 0.38–5.33)
WBC # BLD AUTO: 7.4 K/UL (ref 4.8–10.8)

## 2023-05-29 PROCEDURE — 84153 ASSAY OF PSA TOTAL: CPT

## 2023-05-29 PROCEDURE — 36415 COLL VENOUS BLD VENIPUNCTURE: CPT

## 2023-05-29 PROCEDURE — 84443 ASSAY THYROID STIM HORMONE: CPT

## 2023-05-29 PROCEDURE — 83036 HEMOGLOBIN GLYCOSYLATED A1C: CPT

## 2023-05-29 PROCEDURE — 82306 VITAMIN D 25 HYDROXY: CPT

## 2023-05-29 PROCEDURE — 80053 COMPREHEN METABOLIC PANEL: CPT

## 2023-05-29 PROCEDURE — 80061 LIPID PANEL: CPT

## 2023-05-29 PROCEDURE — 85025 COMPLETE CBC W/AUTO DIFF WBC: CPT

## 2023-05-30 LAB
25(OH)D3 SERPL-MCNC: 22 NG/ML (ref 30–100)
EST. AVERAGE GLUCOSE BLD GHB EST-MCNC: 123 MG/DL
HBA1C MFR BLD: 5.9 % (ref 4–5.6)
PSA SERPL-MCNC: 6.98 NG/ML (ref 0–4)

## 2023-07-19 ENCOUNTER — TELEPHONE (OUTPATIENT)
Dept: HEALTH INFORMATION MANAGEMENT | Facility: OTHER | Age: 58
End: 2023-07-19
Payer: COMMERCIAL

## 2023-09-13 ENCOUNTER — HOSPITAL ENCOUNTER (OUTPATIENT)
Dept: LAB | Facility: MEDICAL CENTER | Age: 58
End: 2023-09-13
Attending: PHYSICIAN ASSISTANT
Payer: COMMERCIAL

## 2023-09-13 PROCEDURE — 36415 COLL VENOUS BLD VENIPUNCTURE: CPT

## 2023-09-13 PROCEDURE — 84153 ASSAY OF PSA TOTAL: CPT

## 2023-09-13 PROCEDURE — 84154 ASSAY OF PSA FREE: CPT

## 2023-09-15 LAB
PSA FREE MFR SERPL: 17 %
PSA FREE SERPL-MCNC: 1.1 NG/ML
PSA SERPL-MCNC: 6.4 NG/ML (ref 0–4)

## 2023-11-13 ENCOUNTER — OFFICE VISIT (OUTPATIENT)
Dept: MEDICAL GROUP | Facility: MEDICAL CENTER | Age: 58
End: 2023-11-13
Payer: COMMERCIAL

## 2023-11-13 VITALS
SYSTOLIC BLOOD PRESSURE: 110 MMHG | RESPIRATION RATE: 16 BRPM | DIASTOLIC BLOOD PRESSURE: 60 MMHG | BODY MASS INDEX: 35.94 KG/M2 | HEART RATE: 76 BPM | HEIGHT: 74 IN | TEMPERATURE: 97.9 F | OXYGEN SATURATION: 96 % | WEIGHT: 280 LBS

## 2023-11-13 DIAGNOSIS — L85.3 DRY SKIN: ICD-10-CM

## 2023-11-13 DIAGNOSIS — M25.561 CHRONIC PAIN OF RIGHT KNEE: ICD-10-CM

## 2023-11-13 DIAGNOSIS — Z23 NEED FOR VACCINATION: ICD-10-CM

## 2023-11-13 DIAGNOSIS — G89.29 CHRONIC PAIN OF RIGHT KNEE: ICD-10-CM

## 2023-11-13 DIAGNOSIS — R21 RASH: ICD-10-CM

## 2023-11-13 DIAGNOSIS — E66.9 OBESITY (BMI 30-39.9): ICD-10-CM

## 2023-11-13 DIAGNOSIS — K76.0 FATTY LIVER: ICD-10-CM

## 2023-11-13 PROBLEM — E66.01 MORBID OBESITY DUE TO EXCESS CALORIES (HCC): Status: RESOLVED | Noted: 2017-07-06 | Resolved: 2023-11-13

## 2023-11-13 PROCEDURE — 3074F SYST BP LT 130 MM HG: CPT | Performed by: STUDENT IN AN ORGANIZED HEALTH CARE EDUCATION/TRAINING PROGRAM

## 2023-11-13 PROCEDURE — 3078F DIAST BP <80 MM HG: CPT | Performed by: STUDENT IN AN ORGANIZED HEALTH CARE EDUCATION/TRAINING PROGRAM

## 2023-11-13 PROCEDURE — 90686 IIV4 VACC NO PRSV 0.5 ML IM: CPT | Performed by: STUDENT IN AN ORGANIZED HEALTH CARE EDUCATION/TRAINING PROGRAM

## 2023-11-13 PROCEDURE — 99214 OFFICE O/P EST MOD 30 MIN: CPT | Mod: 25 | Performed by: STUDENT IN AN ORGANIZED HEALTH CARE EDUCATION/TRAINING PROGRAM

## 2023-11-13 PROCEDURE — 90471 IMMUNIZATION ADMIN: CPT | Performed by: STUDENT IN AN ORGANIZED HEALTH CARE EDUCATION/TRAINING PROGRAM

## 2023-11-13 RX ORDER — TRIAMCINOLONE ACETONIDE 1 MG/G
0.5 CREAM TOPICAL 2 TIMES DAILY
Qty: 15 G | Refills: 11 | Status: SHIPPED | OUTPATIENT
Start: 2023-11-13

## 2023-11-13 RX ORDER — OXYBUTYNIN CHLORIDE 5 MG/1
1 TABLET, EXTENDED RELEASE ORAL DAILY
COMMUNITY
End: 2023-11-13

## 2023-11-13 RX ORDER — TAMSULOSIN HYDROCHLORIDE 0.4 MG/1
1 CAPSULE ORAL DAILY
COMMUNITY
Start: 2023-08-31

## 2023-11-13 ASSESSMENT — FIBROSIS 4 INDEX: FIB4 SCORE: 1.36

## 2023-11-13 ASSESSMENT — PATIENT HEALTH QUESTIONNAIRE - PHQ9: CLINICAL INTERPRETATION OF PHQ2 SCORE: 0

## 2023-11-13 NOTE — PROGRESS NOTES
"Subjective:     CC:     HPI:   Tommy presents today with    Past medical  history of dyslipidemia, prediabetes, fatty liver disease, elevated BMI, family history of colon cancer ( fathers 60s) , overactive bladder, BPH/ elevated PSA - ( follows with urology)     Last lab 5/29/2023 CBC was fine, CMP was fine renal function stable liver enzymes not elevated cholesterol mildly elevated LDL, PSA elevated follows with urology [place], prediabetes 5.9, vitamin D deficiency  Psa stable     - right knee pain  - report got a dog 7/2023    Problem   Obesity (Bmi 30-39.9)   Dry Skin    Linear lesion/ dry skin, pruritus has been treated with triamcinolone 0.1 mg cream, reported previous seen dermatology for this issue      Morbid Obesity Due to Excess Calories (Hcc) (Resolved)         Health Maintenance:     ROS:  ROS    Objective:     Exam:  /60 (BP Location: Right arm, Patient Position: Sitting, BP Cuff Size: Adult)   Pulse 76   Temp 36.6 °C (97.9 °F) (Temporal)   Resp 16   Ht 1.88 m (6' 2\") Comment: patient stated  Wt (!) 127 kg (280 lb)   SpO2 96%   BMI 35.95 kg/m²  Body mass index is 35.95 kg/m².    Physical Exam  Constitutional:       Appearance: Normal appearance.   Cardiovascular:      Rate and Rhythm: Normal rate and regular rhythm.   Pulmonary:      Effort: Pulmonary effort is normal.      Breath sounds: Normal breath sounds.   Musculoskeletal:      Cervical back: Normal range of motion and neck supple.   Lymphadenopathy:      Cervical: No cervical adenopathy.   Neurological:      Mental Status: He is alert.         Labs:     Assessment & Plan:     58 y.o. male with the following -     1. Chronic pain of right knee  Chronic, stable  On exam there was mild TTP in medial joint line, lachman negative, no significant joint laxity noted  Plan  Conservative management     2. Dry skin  Chronic, stable  Nonspecific skin lesion in armpit  Appears linear in armpit with irregular border ( 1cm width)  Appear fungal, " however patient states previously has seen dermatology and prescribed triamcinolone with good control  Reports antifungals makes rash worse.   Plan  Triamcinolone 0.1 crease prescribed  May consider otc clotrimazole if does not improve      3. Obesity (BMI 30-39.9)  Chronic stable  Working on exercising, reports does not snack   - Patient identified as having weight management issue.  Appropriate orders and counseling given.    4. Rash  See assessmet 2    5. Fatty liver  Chronic, stable  Without transaminitis is last lab  Rec diet exercise, and weightloss 7%    6. Need for vaccination    - INFLUENZA VACCINE QUAD INJ (PF)      Return in about 1 year (around 11/13/2024).    Please note that this dictation was created using voice recognition software. I have made every reasonable attempt to correct obvious errors, but I expect that there are errors of grammar and possibly content that I did not discover before finalizing the note.

## 2024-03-20 ENCOUNTER — HOSPITAL ENCOUNTER (OUTPATIENT)
Dept: LAB | Facility: MEDICAL CENTER | Age: 59
End: 2024-03-20
Attending: PHYSICIAN ASSISTANT
Payer: COMMERCIAL

## 2024-03-20 PROCEDURE — 84153 ASSAY OF PSA TOTAL: CPT

## 2024-03-20 PROCEDURE — 84154 ASSAY OF PSA FREE: CPT

## 2024-03-20 PROCEDURE — 36415 COLL VENOUS BLD VENIPUNCTURE: CPT

## 2024-03-22 LAB
PSA FREE MFR SERPL: 18 %
PSA FREE SERPL-MCNC: 1.5 NG/ML
PSA SERPL-MCNC: 8.2 NG/ML (ref 0–4)

## 2024-05-17 ENCOUNTER — HOSPITAL ENCOUNTER (OUTPATIENT)
Dept: RADIOLOGY | Facility: MEDICAL CENTER | Age: 59
End: 2024-05-17
Payer: COMMERCIAL

## 2024-05-17 DIAGNOSIS — R97.20 ELEVATED PROSTATE SPECIFIC ANTIGEN (PSA): ICD-10-CM

## 2024-05-17 RX ADMIN — GADOTERIDOL 20 ML: 279.3 INJECTION, SOLUTION INTRAVENOUS at 09:44

## 2024-05-17 RX ADMIN — GLUCAGON 1 MG: 1 INJECTION, POWDER, LYOPHILIZED, FOR SOLUTION INTRAMUSCULAR; INTRAVENOUS at 08:35

## 2024-10-24 ENCOUNTER — OFFICE VISIT (OUTPATIENT)
Dept: INTERNAL MEDICINE | Facility: OTHER | Age: 59
End: 2024-10-24
Payer: COMMERCIAL

## 2024-10-24 VITALS
SYSTOLIC BLOOD PRESSURE: 110 MMHG | TEMPERATURE: 97.5 F | WEIGHT: 286.6 LBS | DIASTOLIC BLOOD PRESSURE: 62 MMHG | OXYGEN SATURATION: 98 % | BODY MASS INDEX: 36.78 KG/M2 | HEART RATE: 62 BPM | HEIGHT: 74 IN

## 2024-10-24 DIAGNOSIS — Z00.00 HEALTHCARE MAINTENANCE: ICD-10-CM

## 2024-10-24 DIAGNOSIS — E78.5 DYSLIPIDEMIA: ICD-10-CM

## 2024-10-24 DIAGNOSIS — R73.03 PREDIABETES: ICD-10-CM

## 2024-10-24 DIAGNOSIS — Z11.4 SCREENING FOR HIV (HUMAN IMMUNODEFICIENCY VIRUS): ICD-10-CM

## 2024-10-24 DIAGNOSIS — M79.672 LEFT FOOT PAIN: ICD-10-CM

## 2024-10-24 PROBLEM — J39.2 DRY THROAT: Status: ACTIVE | Noted: 2024-10-24

## 2024-10-24 PROBLEM — N40.0 BPH (BENIGN PROSTATIC HYPERPLASIA): Status: ACTIVE | Noted: 2024-10-24

## 2024-10-24 PROBLEM — L30.9 DERMATITIS: Status: ACTIVE | Noted: 2024-10-24

## 2024-10-24 PROCEDURE — 99204 OFFICE O/P NEW MOD 45 MIN: CPT | Mod: GC

## 2024-10-24 SDOH — HEALTH STABILITY: PHYSICAL HEALTH: ON AVERAGE, HOW MANY DAYS PER WEEK DO YOU ENGAGE IN MODERATE TO STRENUOUS EXERCISE (LIKE A BRISK WALK)?: 7 DAYS

## 2024-10-24 SDOH — ECONOMIC STABILITY: INCOME INSECURITY: IN THE LAST 12 MONTHS, WAS THERE A TIME WHEN YOU WERE NOT ABLE TO PAY THE MORTGAGE OR RENT ON TIME?: NO

## 2024-10-24 SDOH — ECONOMIC STABILITY: FOOD INSECURITY: WITHIN THE PAST 12 MONTHS, THE FOOD YOU BOUGHT JUST DIDN'T LAST AND YOU DIDN'T HAVE MONEY TO GET MORE.: PATIENT DECLINED

## 2024-10-24 SDOH — ECONOMIC STABILITY: TRANSPORTATION INSECURITY
IN THE PAST 12 MONTHS, HAS THE LACK OF TRANSPORTATION KEPT YOU FROM MEDICAL APPOINTMENTS OR FROM GETTING MEDICATIONS?: PATIENT DECLINED

## 2024-10-24 SDOH — ECONOMIC STABILITY: FOOD INSECURITY: WITHIN THE PAST 12 MONTHS, YOU WORRIED THAT YOUR FOOD WOULD RUN OUT BEFORE YOU GOT MONEY TO BUY MORE.: PATIENT DECLINED

## 2024-10-24 SDOH — HEALTH STABILITY: PHYSICAL HEALTH: ON AVERAGE, HOW MANY MINUTES DO YOU ENGAGE IN EXERCISE AT THIS LEVEL?: 60 MIN

## 2024-10-24 SDOH — ECONOMIC STABILITY: INCOME INSECURITY: HOW HARD IS IT FOR YOU TO PAY FOR THE VERY BASICS LIKE FOOD, HOUSING, MEDICAL CARE, AND HEATING?: PATIENT DECLINED

## 2024-10-24 SDOH — ECONOMIC STABILITY: TRANSPORTATION INSECURITY
IN THE PAST 12 MONTHS, HAS LACK OF RELIABLE TRANSPORTATION KEPT YOU FROM MEDICAL APPOINTMENTS, MEETINGS, WORK OR FROM GETTING THINGS NEEDED FOR DAILY LIVING?: PATIENT DECLINED

## 2024-10-24 SDOH — ECONOMIC STABILITY: TRANSPORTATION INSECURITY
IN THE PAST 12 MONTHS, HAS LACK OF TRANSPORTATION KEPT YOU FROM MEETINGS, WORK, OR FROM GETTING THINGS NEEDED FOR DAILY LIVING?: PATIENT DECLINED

## 2024-10-24 ASSESSMENT — SOCIAL DETERMINANTS OF HEALTH (SDOH)
HOW OFTEN DO YOU HAVE A DRINK CONTAINING ALCOHOL: MONTHLY OR LESS
HOW OFTEN DO YOU ATTENT MEETINGS OF THE CLUB OR ORGANIZATION YOU BELONG TO?: PATIENT DECLINED
HOW OFTEN DO YOU HAVE SIX OR MORE DRINKS ON ONE OCCASION: NEVER
HOW OFTEN DO YOU GET TOGETHER WITH FRIENDS OR RELATIVES?: PATIENT DECLINED
HOW OFTEN DO YOU ATTEND CHURCH OR RELIGIOUS SERVICES?: PATIENT DECLINED
HOW OFTEN DO YOU ATTEND CHURCH OR RELIGIOUS SERVICES?: PATIENT DECLINED
WITHIN THE PAST 12 MONTHS, YOU WORRIED THAT YOUR FOOD WOULD RUN OUT BEFORE YOU GOT THE MONEY TO BUY MORE: PATIENT DECLINED
HOW HARD IS IT FOR YOU TO PAY FOR THE VERY BASICS LIKE FOOD, HOUSING, MEDICAL CARE, AND HEATING?: PATIENT DECLINED
HOW OFTEN DO YOU ATTENT MEETINGS OF THE CLUB OR ORGANIZATION YOU BELONG TO?: PATIENT DECLINED
HOW MANY DRINKS CONTAINING ALCOHOL DO YOU HAVE ON A TYPICAL DAY WHEN YOU ARE DRINKING: 1 OR 2
IN A TYPICAL WEEK, HOW MANY TIMES DO YOU TALK ON THE PHONE WITH FAMILY, FRIENDS, OR NEIGHBORS?: PATIENT DECLINED
IN THE PAST 12 MONTHS, HAS THE ELECTRIC, GAS, OIL, OR WATER COMPANY THREATENED TO SHUT OFF SERVICE IN YOUR HOME?: NO
IN A TYPICAL WEEK, HOW MANY TIMES DO YOU TALK ON THE PHONE WITH FAMILY, FRIENDS, OR NEIGHBORS?: PATIENT DECLINED
HOW OFTEN DO YOU GET TOGETHER WITH FRIENDS OR RELATIVES?: PATIENT DECLINED

## 2024-10-24 ASSESSMENT — LIFESTYLE VARIABLES
HOW OFTEN DO YOU HAVE A DRINK CONTAINING ALCOHOL: MONTHLY OR LESS
HOW OFTEN DO YOU HAVE SIX OR MORE DRINKS ON ONE OCCASION: NEVER
AUDIT-C TOTAL SCORE: 1
HOW MANY STANDARD DRINKS CONTAINING ALCOHOL DO YOU HAVE ON A TYPICAL DAY: 1 OR 2
SKIP TO QUESTIONS 9-10: 1

## 2024-10-24 ASSESSMENT — ENCOUNTER SYMPTOMS
PALPITATIONS: 0
ABDOMINAL PAIN: 0
HEARTBURN: 0
COUGH: 0
BLOOD IN STOOL: 0
SEIZURES: 0
SHORTNESS OF BREATH: 0
FALLS: 0
LOSS OF CONSCIOUSNESS: 0
NAUSEA: 0
CHILLS: 0
FEVER: 0
PND: 0
FLANK PAIN: 0
NERVOUS/ANXIOUS: 0
DEPRESSION: 0
VOMITING: 0

## 2024-10-24 ASSESSMENT — PATIENT HEALTH QUESTIONNAIRE - PHQ9: CLINICAL INTERPRETATION OF PHQ2 SCORE: 0

## 2024-10-24 ASSESSMENT — FIBROSIS 4 INDEX: FIB4 SCORE: 1.39

## 2024-11-19 ENCOUNTER — HOSPITAL ENCOUNTER (OUTPATIENT)
Dept: LAB | Facility: MEDICAL CENTER | Age: 59
End: 2024-11-19
Payer: COMMERCIAL

## 2024-11-19 LAB
ALBUMIN SERPL BCP-MCNC: 4 G/DL (ref 3.2–4.9)
ALBUMIN/GLOB SERPL: 1.2 G/DL
ALP SERPL-CCNC: 63 U/L (ref 30–99)
ALT SERPL-CCNC: 17 U/L (ref 2–50)
ANION GAP SERPL CALC-SCNC: 9 MMOL/L (ref 7–16)
AST SERPL-CCNC: 19 U/L (ref 12–45)
BILIRUB SERPL-MCNC: 0.5 MG/DL (ref 0.1–1.5)
BUN SERPL-MCNC: 18 MG/DL (ref 8–22)
BUN SERPL-MCNC: 18 MG/DL (ref 8–22)
CALCIUM ALBUM COR SERPL-MCNC: 9.2 MG/DL (ref 8.5–10.5)
CALCIUM SERPL-MCNC: 9.2 MG/DL (ref 8.4–10.2)
CHLORIDE SERPL-SCNC: 103 MMOL/L (ref 96–112)
CHOLEST SERPL-MCNC: 163 MG/DL (ref 100–199)
CO2 SERPL-SCNC: 26 MMOL/L (ref 20–33)
CREAT SERPL-MCNC: 0.86 MG/DL (ref 0.5–1.4)
CREAT SERPL-MCNC: 0.91 MG/DL (ref 0.5–1.4)
EST. AVERAGE GLUCOSE BLD GHB EST-MCNC: 117 MG/DL
FASTING STATUS PATIENT QL REPORTED: NORMAL
GFR SERPLBLD CREATININE-BSD FMLA CKD-EPI: 100 ML/MIN/1.73 M 2
GFR SERPLBLD CREATININE-BSD FMLA CKD-EPI: 97 ML/MIN/1.73 M 2
GLOBULIN SER CALC-MCNC: 3.4 G/DL (ref 1.9–3.5)
GLUCOSE SERPL-MCNC: 107 MG/DL (ref 65–99)
HBA1C MFR BLD: 5.7 % (ref 4–5.6)
HDLC SERPL-MCNC: 57 MG/DL
HIV 1+2 AB+HIV1 P24 AG SERPL QL IA: NORMAL
LDLC SERPL CALC-MCNC: 95 MG/DL
POTASSIUM SERPL-SCNC: 4.5 MMOL/L (ref 3.6–5.5)
PROT SERPL-MCNC: 7.4 G/DL (ref 6–8.2)
PSA SERPL-MCNC: 7.6 NG/ML (ref 0–4)
SODIUM SERPL-SCNC: 138 MMOL/L (ref 135–145)
TRIGL SERPL-MCNC: 57 MG/DL (ref 0–149)

## 2024-11-19 PROCEDURE — 87389 HIV-1 AG W/HIV-1&-2 AB AG IA: CPT

## 2024-11-19 PROCEDURE — 80061 LIPID PANEL: CPT

## 2024-11-19 PROCEDURE — 80053 COMPREHEN METABOLIC PANEL: CPT

## 2024-11-19 PROCEDURE — 84153 ASSAY OF PSA TOTAL: CPT

## 2024-11-19 PROCEDURE — 83036 HEMOGLOBIN GLYCOSYLATED A1C: CPT

## 2024-11-19 PROCEDURE — 82565 ASSAY OF CREATININE: CPT

## 2024-11-19 PROCEDURE — 84520 ASSAY OF UREA NITROGEN: CPT

## 2024-11-19 PROCEDURE — 36415 COLL VENOUS BLD VENIPUNCTURE: CPT

## 2025-03-20 DIAGNOSIS — R21 RASH: ICD-10-CM

## 2025-03-20 RX ORDER — TRIAMCINOLONE ACETONIDE 1 MG/G
CREAM TOPICAL
Qty: 15 G | Refills: 0 | Status: SHIPPED | OUTPATIENT
Start: 2025-03-20

## 2025-03-20 NOTE — TELEPHONE ENCOUNTER
Received request via: Pharmacy    Was the patient seen in the last year in this department?  YES    Does the patient have an active prescription (recently filled or refills available) for medication(s) requested? No    Pharmacy Name:  [id number] - [place], [place] - [address]     Does the patient have halfway Plus and need 100-day supply? (This applies to ALL medications) Patient does not have SCP

## 2025-04-07 ENCOUNTER — APPOINTMENT (OUTPATIENT)
Dept: INTERNAL MEDICINE | Facility: OTHER | Age: 60
End: 2025-04-07
Payer: COMMERCIAL

## 2025-04-10 ENCOUNTER — OFFICE VISIT (OUTPATIENT)
Dept: INTERNAL MEDICINE | Facility: OTHER | Age: 60
End: 2025-04-10
Payer: COMMERCIAL

## 2025-04-10 VITALS
BODY MASS INDEX: 37.05 KG/M2 | TEMPERATURE: 98.4 F | HEART RATE: 68 BPM | OXYGEN SATURATION: 97 % | DIASTOLIC BLOOD PRESSURE: 78 MMHG | WEIGHT: 288.6 LBS | SYSTOLIC BLOOD PRESSURE: 108 MMHG

## 2025-04-10 DIAGNOSIS — M79.671 PAIN IN BOTH FEET: ICD-10-CM

## 2025-04-10 DIAGNOSIS — E66.9 OBESITY (BMI 30-39.9): ICD-10-CM

## 2025-04-10 DIAGNOSIS — L30.9 DERMATITIS: ICD-10-CM

## 2025-04-10 DIAGNOSIS — Z00.00 HEALTHCARE MAINTENANCE: ICD-10-CM

## 2025-04-10 DIAGNOSIS — R21 RASH: ICD-10-CM

## 2025-04-10 DIAGNOSIS — M79.672 PAIN IN BOTH FEET: ICD-10-CM

## 2025-04-10 DIAGNOSIS — Z23 NEED FOR PNEUMOCOCCAL VACCINATION: ICD-10-CM

## 2025-04-10 PROCEDURE — 99213 OFFICE O/P EST LOW 20 MIN: CPT | Mod: 25,GC

## 2025-04-10 PROCEDURE — 3074F SYST BP LT 130 MM HG: CPT | Mod: GC

## 2025-04-10 PROCEDURE — 3078F DIAST BP <80 MM HG: CPT | Mod: GC

## 2025-04-10 PROCEDURE — 90471 IMMUNIZATION ADMIN: CPT | Performed by: INTERNAL MEDICINE

## 2025-04-10 PROCEDURE — 90677 PCV20 VACCINE IM: CPT | Performed by: INTERNAL MEDICINE

## 2025-04-10 RX ORDER — TRIAMCINOLONE ACETONIDE 1 MG/G
0.5 CREAM TOPICAL 2 TIMES DAILY
Qty: 15 G | Refills: 0 | Status: SHIPPED | OUTPATIENT
Start: 2025-04-10

## 2025-04-10 ASSESSMENT — ENCOUNTER SYMPTOMS
PALPITATIONS: 0
VOMITING: 0
LOSS OF CONSCIOUSNESS: 0
NERVOUS/ANXIOUS: 0
BLOOD IN STOOL: 0
ABDOMINAL PAIN: 0
FEVER: 0
SEIZURES: 0
DEPRESSION: 0
SHORTNESS OF BREATH: 0
HEARTBURN: 0
CHILLS: 0
NAUSEA: 0
COUGH: 0

## 2025-04-10 ASSESSMENT — FIBROSIS 4 INDEX: FIB4 SCORE: 1.39

## 2025-04-10 ASSESSMENT — PATIENT HEALTH QUESTIONNAIRE - PHQ9: CLINICAL INTERPRETATION OF PHQ2 SCORE: 0

## 2025-04-10 NOTE — ASSESSMENT & PLAN NOTE
History of eczematous like rashes on his bilateral arms, currently without flareup however requesting refill of Kenalog cream.    -Refilled Kenalog cream 0.5 mg twice daily as needed

## 2025-04-10 NOTE — PROGRESS NOTES
Established Patient    Patient Care Team:  Saad Chavarria D.O. as PCP - General (Internal Medicine)    Tommy Guillen is a 59 y.o. male who presents today with the following Chief Complaint(s): Follow up for Diagnoses of Pain in both feet, Rash, Dermatitis, Obesity (BMI 30-39.9), Need for pneumococcal vaccination, and Healthcare maintenance were pertinent to this visit.    HPI:  Patient is a 59-year-old male with history of prediabetes, hyperlipidemia, elevated PSA, fatty liver, vitamin D deficiency presenting for routine follow-up visit. Previously seen for a left posterior foot pain which seems to have mostly self resolved without patient using Voltaren gel.  Did not obtain x-ray imaging either.  Now reports pain on the right dorsal aspect of his anterior right foot.  Typically worse in the mornings and sometimes improves in the day, however if he is walking downhill can significantly increase pain.  It is not to the point that is debilitating, however has caused him some discomfort.  Otherwise no other complaints at this time.  Amenable to obtaining his pneumococcal vaccinations at this visit.  Also requesting refill for triamcinolone cream for which he uses on an as-needed basis for eczema flareups, currently without a flare however once some in hand.    Review of Systems   Constitutional:  Negative for chills and fever.   Respiratory:  Negative for cough and shortness of breath.    Cardiovascular:  Negative for chest pain and palpitations.   Gastrointestinal:  Negative for abdominal pain, blood in stool, heartburn, melena, nausea and vomiting.   Genitourinary:  Negative for dysuria and hematuria.   Musculoskeletal:  Positive for joint pain.   Neurological:  Negative for seizures and loss of consciousness.   Psychiatric/Behavioral:  Negative for depression. The patient is not nervous/anxious.        Past Medical History:   Diagnosis Date    Asymptomatic cholelithiasis 01/01/2012    BPPV (benign paroxysmal  positional vertigo) 11/16/2015    Colon polyps 01/01/2012    [facility]    Family history of colon cancer     Morbid obesity (HCC) 07/02/2013     Social History     Tobacco Use    Smoking status: Never    Smokeless tobacco: Never   Vaping Use    Vaping status: Never Used   Substance Use Topics    Alcohol use: Yes     Comment: Rare    Drug use: No     Current Outpatient Medications   Medication Sig Dispense Refill    diclofenac sodium (VOLTAREN) 1 % Gel Apply 2 g topically 4 times a day as needed (left foot pain). 50 g 1    triamcinolone acetonide (KENALOG) 0.1 % Cream Apply 0.5 g topically 2 times a day. 15 g 0    tamsulosin (FLOMAX) 0.4 MG capsule Take 1 Capsule by mouth every day.       No current facility-administered medications for this visit.       /78 (BP Location: Left arm, Patient Position: Sitting, BP Cuff Size: Adult)   Pulse 68   Temp 36.9 °C (98.4 °F) (Temporal)   Wt (!) 131 kg (288 lb 9.6 oz)   SpO2 97%   BMI 37.05 kg/m²   Physical Exam  Constitutional:       General: He is not in acute distress.     Appearance: He is obese.   HENT:      Head: Normocephalic and atraumatic.      Mouth/Throat:      Mouth: Mucous membranes are moist.   Eyes:      Conjunctiva/sclera: Conjunctivae normal.   Cardiovascular:      Rate and Rhythm: Normal rate and regular rhythm.      Heart sounds: No murmur heard.     No friction rub. No gallop.   Pulmonary:      Breath sounds: No wheezing, rhonchi or rales.   Abdominal:      General: Abdomen is flat.      Palpations: Abdomen is soft.      Tenderness: There is no abdominal tenderness. There is no guarding or rebound.   Musculoskeletal:         General: Deformity (Right foot bunion deformity) present. No swelling, tenderness or signs of injury. Normal range of motion.      Right lower leg: No edema.      Left lower leg: No edema.   Skin:     General: Skin is warm.   Neurological:      General: No focal deficit present.      Mental Status: He is alert and oriented  to person, place, and time.   Psychiatric:         Mood and Affect: Mood normal.         Behavior: Behavior normal.         Assessment and Plan:     Pain in both feet  Previously with left foot posterior superior calcaneal pain which seems to have resolved, however now with the right dorsal anterior foot pain.  Physical exam unremarkable with intact range of motion and no tenderness to palpation.  Patient likely experiencing MSK related strain.    - Advised to obtain bilateral feet x-ray to assess for osteoarthritis  -Prescribed Voltaren gel for patient to use as needed for pain  - Referred to physical therapy    Obesity (BMI 30-39.9)  BMI 37.05 this visit with a weight of 288 pounds.  Has not been significantly physically active, however trying to watch diet.    - Counseled extensively on lifestyle modifications  -If refractory will consider nutrition services    Dermatitis  History of eczematous like rashes on his bilateral arms, currently without flareup however requesting refill of Kenalog cream.    -Refilled Kenalog cream 0.5 mg twice daily as needed    Healthcare maintenance  -Providing PCV 20 vaccination this visit      Orders Placed This Encounter    DX-FOOT-COMPLETE 3+ RIGHT    Pneumococcal Conjugate Vaccine 20-Valent (6 wks+)    Referral to Physical Therapy    DISCONTD: diclofenac sodium (VOLTAREN) 1 % Gel    diclofenac sodium (VOLTAREN) 1 % Gel    triamcinolone acetonide (KENALOG) 0.1 % Cream       Return in about 3 months (around 7/10/2025) for With Dr. Montana.    Case discussed with Dr. Jones    This note was created using voice recognition software. While every attempt is made to ensure accuracy of transcription, occasionally errors occur.     Saad Chavarria D.O. PGY III  Internal Medicine  [facility]

## 2025-04-10 NOTE — ASSESSMENT & PLAN NOTE
BMI 37.05 this visit with a weight of 288 pounds.  Has not been significantly physically active, however trying to watch diet.    - Counseled extensively on lifestyle modifications  -If refractory will consider nutrition services

## 2025-04-10 NOTE — ASSESSMENT & PLAN NOTE
Previously with left foot posterior superior calcaneal pain which seems to have resolved, however now with the right dorsal anterior foot pain.  Physical exam unremarkable with intact range of motion and no tenderness to palpation.  Patient likely experiencing MSK related strain.    - Advised to obtain bilateral feet x-ray to assess for osteoarthritis  -Prescribed Voltaren gel for patient to use as needed for pain  - Referred to physical therapy

## 2025-04-15 NOTE — Clinical Note
REFERRAL APPROVAL NOTICE         Sent on April 15, 2025                   Tommy Hayes Guillen  [address] [place] [address]                   Dear Mr. Guillen,    After a careful review of the medical information and benefit coverage, [facility] has processed your referral. See below for additional details.    If applicable, you must be actively enrolled with your insurance for coverage of the authorized service. If you have any questions regarding your coverage, please contact your insurance directly.    REFERRAL INFORMATION   Referral #:  [id number]  Referred-To Department    Referred-By Provider:  Physical Therapy    Saad Chavarria D.O.   [facility] [address]      [address]  [place] [address]  [phone] [address]  [address]  [place] [id number] [phone]    Referral Start Date:  04/10/2025  Referral End Date:   04/10/2026             SCHEDULING  If you do not already have an appointment, please call [phone] to make an appointment.     MORE INFORMATION  If you do not already have a [employer] account, sign up at: [email or web address]  You can access your medical information, make appointments, see lab results, billing information, and more.  If you have questions regarding this referral, please contact  the [facility] Referrals department at:             [phone]. Monday - Friday 8:00AM - 5:00PM.     Sincerely,    [facility]

## 2025-05-20 ENCOUNTER — HOSPITAL ENCOUNTER (OUTPATIENT)
Facility: MEDICAL CENTER | Age: 60
End: 2025-05-20
Payer: COMMERCIAL

## 2025-05-20 LAB — PSA SERPL-MCNC: 8.39 NG/ML (ref 0–4)

## 2025-05-20 PROCEDURE — 36415 COLL VENOUS BLD VENIPUNCTURE: CPT

## 2025-05-20 PROCEDURE — 84153 ASSAY OF PSA TOTAL: CPT
